# Patient Record
Sex: FEMALE | Race: WHITE | NOT HISPANIC OR LATINO | Employment: OTHER | URBAN - METROPOLITAN AREA
[De-identification: names, ages, dates, MRNs, and addresses within clinical notes are randomized per-mention and may not be internally consistent; named-entity substitution may affect disease eponyms.]

---

## 2017-02-15 ENCOUNTER — LAB REQUISITION (OUTPATIENT)
Dept: LAB | Facility: HOSPITAL | Age: 67
End: 2017-02-15

## 2017-02-15 DIAGNOSIS — Z00.00 ENCOUNTER FOR GENERAL ADULT MEDICAL EXAMINATION WITHOUT ABNORMAL FINDINGS: ICD-10-CM

## 2017-02-15 PROCEDURE — 87186 SC STD MICRODIL/AGAR DIL: CPT | Performed by: OTOLARYNGOLOGY

## 2017-02-15 PROCEDURE — 87070 CULTURE OTHR SPECIMN AEROBIC: CPT | Performed by: OTOLARYNGOLOGY

## 2017-02-15 PROCEDURE — 87147 CULTURE TYPE IMMUNOLOGIC: CPT | Performed by: OTOLARYNGOLOGY

## 2017-02-15 PROCEDURE — 87205 SMEAR GRAM STAIN: CPT | Performed by: OTOLARYNGOLOGY

## 2017-02-17 LAB
BACTERIA SPEC AEROBE CULT: ABNORMAL
GRAM STN SPEC: ABNORMAL
GRAM STN SPEC: ABNORMAL

## 2017-03-29 ENCOUNTER — LAB REQUISITION (OUTPATIENT)
Dept: LAB | Facility: HOSPITAL | Age: 67
End: 2017-03-29

## 2017-03-29 DIAGNOSIS — J32.8 OTHER CHRONIC SINUSITIS: ICD-10-CM

## 2017-03-29 PROCEDURE — 87070 CULTURE OTHR SPECIMN AEROBIC: CPT | Performed by: OTOLARYNGOLOGY

## 2017-03-29 PROCEDURE — 87205 SMEAR GRAM STAIN: CPT | Performed by: OTOLARYNGOLOGY

## 2017-03-29 PROCEDURE — 87147 CULTURE TYPE IMMUNOLOGIC: CPT | Performed by: OTOLARYNGOLOGY

## 2017-03-29 PROCEDURE — 87186 SC STD MICRODIL/AGAR DIL: CPT | Performed by: OTOLARYNGOLOGY

## 2017-04-01 LAB
BACTERIA SPEC AEROBE CULT: ABNORMAL
GRAM STN SPEC: ABNORMAL
GRAM STN SPEC: ABNORMAL

## 2017-07-07 ENCOUNTER — LAB REQUISITION (OUTPATIENT)
Dept: LAB | Facility: HOSPITAL | Age: 67
End: 2017-07-07

## 2017-07-07 DIAGNOSIS — Z00.00 ENCOUNTER FOR GENERAL ADULT MEDICAL EXAMINATION WITHOUT ABNORMAL FINDINGS: ICD-10-CM

## 2017-07-07 PROCEDURE — 87070 CULTURE OTHR SPECIMN AEROBIC: CPT | Performed by: OTOLARYNGOLOGY

## 2017-07-07 PROCEDURE — 87205 SMEAR GRAM STAIN: CPT | Performed by: OTOLARYNGOLOGY

## 2017-07-07 PROCEDURE — 87186 SC STD MICRODIL/AGAR DIL: CPT | Performed by: OTOLARYNGOLOGY

## 2017-07-07 PROCEDURE — 87147 CULTURE TYPE IMMUNOLOGIC: CPT | Performed by: OTOLARYNGOLOGY

## 2017-07-09 LAB
BACTERIA SPEC AEROBE CULT: ABNORMAL
GRAM STN SPEC: ABNORMAL
GRAM STN SPEC: ABNORMAL

## 2017-12-07 ENCOUNTER — OFFICE VISIT (OUTPATIENT)
Dept: PAIN MEDICINE | Facility: CLINIC | Age: 67
End: 2017-12-07

## 2017-12-07 VITALS
SYSTOLIC BLOOD PRESSURE: 153 MMHG | HEART RATE: 86 BPM | TEMPERATURE: 97.6 F | RESPIRATION RATE: 16 BRPM | BODY MASS INDEX: 41.66 KG/M2 | WEIGHT: 180 LBS | DIASTOLIC BLOOD PRESSURE: 98 MMHG | HEIGHT: 55 IN | OXYGEN SATURATION: 94 %

## 2017-12-07 DIAGNOSIS — M54.16 LEFT LUMBAR RADICULOPATHY: ICD-10-CM

## 2017-12-07 DIAGNOSIS — G57.02 PIRIFORMIS SYNDROME OF LEFT SIDE: Primary | ICD-10-CM

## 2017-12-07 DIAGNOSIS — M79.18 PAIN IN LEFT BUTTOCK: ICD-10-CM

## 2017-12-07 LAB
POC AMPHETAMINES: NEGATIVE
POC BARBITURATES: NEGATIVE
POC BENZODIAZEPHINES: POSITIVE
POC COCAINE: NEGATIVE
POC METHADONE: NEGATIVE
POC METHAMPHETAMINE SCREEN URINE: NEGATIVE
POC OPIATES: POSITIVE
POC OXYCODONE: NEGATIVE
POC PHENCYCLIDINE: NEGATIVE
POC PROPOXYPHENE: NEGATIVE
POC THC: NEGATIVE
POC TRICYCLIC ANTIDEPRESSANTS: NEGATIVE

## 2017-12-07 PROCEDURE — 99204 OFFICE O/P NEW MOD 45 MIN: CPT | Performed by: PAIN MEDICINE

## 2017-12-07 PROCEDURE — 80305 DRUG TEST PRSMV DIR OPT OBS: CPT | Performed by: PAIN MEDICINE

## 2017-12-07 RX ORDER — FLUTICASONE PROPIONATE 50 MCG
SPRAY, SUSPENSION (ML) NASAL
COMMUNITY
Start: 2013-08-23 | End: 2018-02-08

## 2017-12-07 RX ORDER — MELOXICAM 15 MG/1
TABLET ORAL
Refills: 0 | COMMUNITY
Start: 2017-09-06 | End: 2017-12-07 | Stop reason: SDUPTHER

## 2017-12-07 RX ORDER — OXYCODONE AND ACETAMINOPHEN 7.5; 325 MG/1; MG/1
TABLET ORAL
COMMUNITY
Start: 2013-07-23 | End: 2017-12-07

## 2017-12-07 RX ORDER — HYDROCODONE BITARTRATE AND ACETAMINOPHEN 7.5; 325 MG/1; MG/1
1 TABLET ORAL EVERY 8 HOURS PRN
Qty: 90 TABLET | Refills: 0 | Status: SHIPPED | OUTPATIENT
Start: 2017-12-07 | End: 2017-12-28 | Stop reason: DRUGHIGH

## 2017-12-07 RX ORDER — PANTOPRAZOLE SODIUM 40 MG/1
40 TABLET, DELAYED RELEASE ORAL DAILY PRN
COMMUNITY
Start: 2015-07-24

## 2017-12-07 RX ORDER — TRAMADOL HYDROCHLORIDE 50 MG/1
50 TABLET ORAL EVERY 6 HOURS PRN
COMMUNITY
End: 2017-12-07

## 2017-12-07 RX ORDER — TRAZODONE HYDROCHLORIDE 50 MG/1
50 TABLET ORAL
COMMUNITY
Start: 2017-03-29 | End: 2018-02-08 | Stop reason: RX

## 2017-12-07 RX ORDER — BACLOFEN 10 MG/1
TABLET ORAL
Refills: 0 | COMMUNITY
Start: 2017-11-08 | End: 2018-02-08

## 2017-12-07 RX ORDER — HYDROCODONE BITARTRATE AND ACETAMINOPHEN 5; 325 MG/1; MG/1
TABLET ORAL
Refills: 0 | COMMUNITY
Start: 2017-11-09 | End: 2017-12-07

## 2017-12-07 RX ORDER — AMLODIPINE BESYLATE AND BENAZEPRIL HYDROCHLORIDE 5; 10 MG/1; MG/1
CAPSULE ORAL DAILY
COMMUNITY
Start: 2013-07-23 | End: 2018-03-22

## 2017-12-07 RX ORDER — LORAZEPAM 1 MG/1
TABLET ORAL
COMMUNITY
Start: 2013-07-23 | End: 2018-03-22

## 2017-12-07 RX ORDER — MELOXICAM 15 MG/1
TABLET ORAL
COMMUNITY
Start: 2013-07-23 | End: 2017-12-28

## 2017-12-07 NOTE — PROGRESS NOTES
"CHIEF COMPLAINT: Leg Pain    Jocelyn Grimes is a 67 y.o. female.   He was referred here by Dr Ellington. He presents to the office for evaluation and treatment of left Leg Pain    HPI  Leg Pain  Started 10/17, no known accident or trauma, woke up with pain. However, Pt had a Lumbar fusion in 9/2015 per Dr Deni Gomez with excellent relief until \"suddenly\" developing L leg pain on 10/31/17. Pt still has no further LBP since 2015 fusion. Saw PCP for acute worsening left leg pain, ordered steroid taper and norco. Taking NSAIDS, baclofen, norco, steroid with minimal to no help. Trouble walking, sleeping, walking due to pain.     The patient states their pain is a 8 on a scale of 1-10.  The patient describes this pain as constant dull and ache.  The pain is located in left buttock and does radiate posterior left leg down to foot. This painful problem is aggravated by physical activity and walking and is alleviated by relaxation.    Not falling, tripping. No weakness in LLE.     Past pain medications:   Gabapentin - no help  Medrol dose pack - no help  Aleve prn - some help  Tramadol 50 mg qid prn - minimal help  norco 10/325 mg - helped    Current pain medications:   norco 5/325 mg - 1-2 tablets a day  mobic 15 mg daily - minimal help  Baclofen 10 mg    Past therapies:  Physical Therapy: no - not for LLE pain - for back pain years ago  Chiropractor: no  Massage Therapy: no  TENS: no  Neck or back surgery: yes - lumbar fusion 9/2015 by Dr. Gomez  Past pain management: yes - saw Dr. Paulino prior to surgery 2015.     Previous Injections: yes, pre lumbar fusion, total of 6 Lumbar ESIs, per Dr. Croft in 2015 with moderate relief  Effect of Injection (%): 60% to 30%   Length of Relief: 3 months to 3 weeks.     PEG Assessment   What number best describes your pain on average in the past week? 9  What number best describes how, during the past week, pain has interfered with your enjoyment of life? 8  What number " best describes how, during the past week, pain has interfered with your general activity? 9      Current Outpatient Prescriptions:   •  amLODIPine-benazepril (LOTREL) 5-10 MG per capsule, Take  by mouth Daily., Disp: , Rfl:   •  baclofen (LIORESAL) 10 MG tablet, take 1 tablet by mouth three times a day if needed for muscle spasm, Disp: , Rfl: 0  •  fluticasone (FLONASE) 50 MCG/ACT nasal spray, into each nostril., Disp: , Rfl:   •  LORazepam (ATIVAN) 1 MG tablet, Take  by mouth., Disp: , Rfl:   •  meloxicam (MOBIC) 15 MG tablet, Take  by mouth., Disp: , Rfl:   •  pantoprazole (PROTONIX) 40 MG EC tablet, Take 40 mg by mouth., Disp: , Rfl:   •  traZODone (DESYREL) 50 MG tablet, Take 50 mg by mouth., Disp: , Rfl:   •  HYDROcodone-acetaminophen (NORCO) 7.5-325 MG per tablet, Take 1 tablet by mouth Every 8 (Eight) Hours As Needed for Moderate Pain  or Severe Pain ., Disp: 90 tablet, Rfl: 0    REVIEW OF PERTINENT MEDICAL DATA  Chart reviewed and summarization of all medical records up to new patient visit performed.  PCP notes reviewed. Diagnosed     IMAGING            Missing 2nd page from mri performed in 2015.     PFSH:  The following portions of the patient's history were reviewed and updated as appropriate: problem list, past medical history, past surgery history, social history, family history, medications, and allergies    Review of Systems   Constitutional: Positive for activity change (decreased) and appetite change (decreased).   HENT: Positive for hearing loss and sinus pressure. Negative for trouble swallowing.    Eyes: Negative for visual disturbance.   Respiratory: Negative for apnea, chest tightness and shortness of breath.    Cardiovascular: Negative for chest pain.   Gastrointestinal: Negative for constipation, diarrhea and nausea.   Genitourinary: Negative for difficulty urinating and dysuria.   Musculoskeletal: Back pain: L leg pain.   Neurological: Negative for dizziness, light-headedness and numbness.  "  Psychiatric/Behavioral: Positive for sleep disturbance. Negative for suicidal ideas.   All other systems reviewed and are negative.      Vitals:    12/07/17 1037   BP: 153/98   Pulse: 86   Resp: 16   Temp: 97.6 °F (36.4 °C)   SpO2: 94%   Weight: 81.6 kg (180 lb)   Height: 64 cm (25.2\")   PainSc: 8  Comment: L leg pain,posterior, ranges from 6-9/10   PainLoc: Leg       Physical Exam   Constitutional: She appears well-developed and well-nourished. No distress.   HENT:   Head: Normocephalic and atraumatic.   Nose: Nose normal.   Mouth/Throat: Oropharynx is clear and moist.   Eyes: Conjunctivae and EOM are normal.   Neck: Normal range of motion. Neck supple.   Cardiovascular: Normal rate, regular rhythm and normal heart sounds.    Pulmonary/Chest: Effort normal and breath sounds normal. No stridor. No respiratory distress.   Abdominal: Soft. Bowel sounds are normal. She exhibits no distension. There is no tenderness.   Musculoskeletal:        Lumbar back: She exhibits decreased range of motion. She exhibits no tenderness and no pain.   Neurological: She is alert. She has normal strength. No cranial nerve deficit or sensory deficit. Gait (shuffling) abnormal. Coordination normal.   Skin: Skin is warm and dry. No rash noted. She is not diaphoretic.   Psychiatric: She has a normal mood and affect. Her speech is normal and behavior is normal.   Nursing note and vitals reviewed.    Back Exam     Tests   Straight leg raise right: negative  Straight leg raise left: positive          Neurologic Exam     Mental Status   Speech: speech is normal     Cranial Nerves     CN III, IV, VI   Extraocular motions are normal.     Motor Exam     Strength   Strength 5/5 throughout.       No results found for: POCMETH, POCAMPHET, POCBARBITUR, POCBENZO, POCCOCAINE, POCMETHADO, POCOPIATES, POCOXYCODO, POCPHENCYC, POCPROPOXY, POCTHC, POCTRICYC    Comments: Reviewed POC today      Date of last TRENT reviewed : 12/07/17   Comments: Consistent "     Assessment/Plan   Jocelyn was seen today for leg pain.    Diagnoses and all orders for this visit:    Piriformis syndrome of left side  -     Case Request  -     HYDROcodone-acetaminophen (NORCO) 7.5-325 MG per tablet; Take 1 tablet by mouth Every 8 (Eight) Hours As Needed for Moderate Pain  or Severe Pain .  -     Ambulatory Referral to Physical Therapy Evaluate and treat  -     POC Urine Drug Screen, Triage  -     Urine Drug Screen Confirmation - Urine, Urine, Clean Catch    Pain in left buttock    Left lumbar radiculopathy      Requested Prescriptions     Signed Prescriptions Disp Refills   • HYDROcodone-acetaminophen (NORCO) 7.5-325 MG per tablet 90 tablet 0     Sig: Take 1 tablet by mouth Every 8 (Eight) Hours As Needed for Moderate Pain  or Severe Pain .     - Patient sounds like classic pirirformis syndrome. Discussed treatment options.   - Will place PT referral.   - Continue to take mobic daily for short term. Continue to take baclofen prn for short term.   - Discussed with the patient regarding the etiology of their pain. Informed them that they would likely benefit from a left sided piriformis muscle steroid injection.  The procedure was described in detail and the risks, benefits and alternatives were discussed with the patient (including but not limited to: bleeding, infection, nerve damage, worsening of pain, inability to perform injection, paralysis, seizures, and death) who agreed to proceed.     - Will perform two injections approx 1 month apart.   - Will write for norco short term until she can get some relief. Will increase strength from 5 to 7.5 for better control. Do not plan on prescribing long term. If steroid injection with PT does not resolve pain, will repeat low back imaging and consider evaluation by surgeon given history of low back surgery.   - Continue home exercise program.       Patient counseled on the importance of weight loss to help with overall health and pain control.  Patient instructed to attempt weight loss.   Plan: Calorie counting reduce portion size, cut out extra servings and reduce fast food intake    Follow-up in 2 months.    Juana Summers MD  Pain Management

## 2017-12-07 NOTE — PATIENT INSTRUCTIONS
Piriformis Syndrome With Rehab  Piriformis syndrome is a condition the affects the nervous system in the area of the hip, and is characterized by pain and possibly a loss of feeling in the backside (posterior) thigh that may extend down the entire length of the leg. The symptoms are caused by an increase in pressure on the sciatic nerve by the piriformis muscle, which is on the back of the hip and is responsible for externally rotating the hip. The sciatic nerve and its branches connect to much of the leg. Normally the sciatic nerve runs between the piriformis muscle and other muscles. However, in certain individuals the nerve runs through the muscle, which causes an increase in pressure on the nerve and results in the symptoms of piriformis syndrome.  SYMPTOMS   · Pain, tingling, numbness, or burning in the back of the thigh that may also extend down the entire leg.  · Occasionally, tenderness in the buttock.  · Loss of function of the leg.  · Pain that worsens when using the piriformis muscle (running, jumping, or stairs).  · Pain that increases with prolonged sitting.  · Pain that is lessened by lying flat on the back.  CAUSES   · Piriformis syndrome is the result of an increase in pressure placed on the sciatic nerve. Oftentimes, piriformis syndrome is an overuse injury.  · Stress placed on the nerve from a sudden increase in the intensity, frequency, or duration of training.  · Compensation of other extremity injuries.  RISK INCREASES WITH:  · Sports that involve the piriformis muscle (running, walking, or jumping).  · You are born with (congenital) a defect in which the sciatic nerve passes through the muscle.  PREVENTION  · Warm up and stretch properly before activity.  · Allow for adequate recovery between workouts.  · Maintain physical fitness:    Strength, flexibility, and endurance.    Cardiovascular fitness.  PROGNOSIS   If treated properly, the symptoms of piriformis syndrome usually resolve in 2 to 6  weeks.  RELATED COMPLICATIONS   · Persistent and possibly permanent pain and numbness in the lower extremity.  · Weakness of the extremity that may progress to disability and inability to compete.  TREATMENT   The most effective treatment for piriformis syndrome is rest from any activities that aggravate the symptoms. Ice and pain medication may help reduce pain and inflammation. The use of strengthening and stretching exercises may help reduce pain with activity. These exercises may be performed at home or with a therapist. A referral to a therapist may be given for further evaluation and treatment, such as ultrasound. Corticosteroid injections may be given to reduce inflammation that is causing pressure to be placed on the sciatic nerve. If nonsurgical (conservative) treatment is unsuccessful, then surgery may be recommended.   MEDICATION   · If pain medication is necessary, then nonsteroidal anti-inflammatory medications, such as aspirin and ibuprofen, or other minor pain relievers, such as acetaminophen, are often recommended.  · Do not take pain medication for 7 days before surgery.  · Prescription pain relievers may be given if deemed necessary by your caregiver. Use only as directed and only as much as you need.  · Corticosteroid injections may be given by your caregiver. These injections should be reserved for the most serious cases, because they may only be given a certain number of times.  HEAT AND COLD:   · Cold treatment (icing) relieves pain and reduces inflammation. Cold treatment should be applied for 10 to 15 minutes every 2 to 3 hours for inflammation and pain and immediately after any activity that aggravates your symptoms. Use ice packs or massage the area with a piece of ice (ice massage).  · Heat treatment may be used prior to performing the stretching and strengthening activities prescribed by your caregiver, physical therapist, or . Use a heat pack or soak the injury in warm  water.  SEEK IMMEDIATE MEDICAL CARE IF:  · Treatment seems to offer no benefit, or the condition worsens.  · Any medications produce adverse side effects.  EXERCISES  RANGE OF MOTION (ROM) AND STRETCHING EXERCISES - Piriformis Syndrome  These exercises may help you when beginning to rehabilitate your injury. Your symptoms may resolve with or without further involvement from your physician, physical therapist, or . While completing these exercises, remember:   · Restoring tissue flexibility helps normal motion to return to the joints. This allows healthier, less painful movement and activity.  · An effective stretch should be held for at least 30 seconds.  · A stretch should never be painful. You should only feel a gentle lengthening or release in the stretched tissue.  STRETCH - Hip Rotators  · Lie on your back on a firm surface. Grasp your right / left knee with your right / left hand and your ankle with your opposite hand.  · Keeping your hips and shoulders firmly planted, gently pull your right / left knee and rotate your lower leg toward your opposite shoulder until you feel a stretch in your buttocks.  · Hold this stretch for __________ seconds.  Repeat this stretch __________ times. Complete this stretch __________ times per day.  STRETCH - Iliotibial Band  · On the floor or bed, lie on your side so your right / left leg is on top. Bend your knee and grab your ankle.  · Slowly bring your knee back so that your thigh is in line with your trunk. Keep your heel at your buttocks and gently arch your back so your head, shoulders, and hips line up.  · Slowly lower your leg so that your knee approaches the floor/bed until you feel a gentle stretch on the outside of your right / left thigh. If you do not feel a stretch and your knee will not fall farther, place the heel of your opposite foot on top of your knee and pull your thigh down farther.  · Hold this stretch for __________ seconds.  Repeat  __________ times. Complete __________ times per day.  STRENGTHENING EXERCISES - Piriformis Syndrome   These are some of the caregiver again or until your symptoms are resolved. Remember:   · Strong muscles with good endurance tolerate stress better.  · Do the exercises as initially prescribed by your caregiver. Progress slowly with each exercise, gradually increasing the number of repetitions and weight used under their guidance.  STRENGTH - Hip Abductors, Straight Leg Raises  Be aware of your form throughout the entire exercise so that you exercise the correct muscles. Sloppy form means that you are not strengthening the correct muscles.  · Lie on your side so that your head, shoulders, knee, and hip line up. You may bend your lower knee to help maintain your balance. Your right / left leg should be on top.  · Roll your hips slightly forward, so that your hips are stacked directly over each other and your right / left knee is facing forward.  · Lift your top leg up 4-6 inches, leading with your heel. Be sure that your foot does not drift forward or that your knee does not roll toward the ceiling.  · Hold this position for __________ seconds. You should feel the muscles in your outer hip lifting (you may not notice this until your leg begins to tire).  · Slowly lower your leg to the starting position. Allow the muscles to fully relax before beginning the next repetition.  Repeat __________ times. Complete this exercise __________ times per day.   STRENGTH - Hip Abductors, Quadruped  · On a firm, lightly padded surface, position yourself on your hands and knees. Your hands should be directly below your shoulders and your knees should be directly below your hips.  · Keeping your right / left knee bent, lift your leg out to the side. Keep your legs level and in line with your shoulders.  · Position yourself on your hands and knees.  · Hold for __________ seconds.  · Keeping your trunk steady and your hips level, slowly  "lower your leg to the starting position.  Repeat __________ times. Complete this exercise __________ times per day.   STRENGTH - Hip Abductors, Standing  · Tie one end of a rubber exercise band/tubing to a secure surface (table, pole) and tie a loop at the other end.  · Place the loop around your right / left ankle. Keeping your ankle with the band directly opposite of the secured end, step away until there is tension in the tube/band.  · Hold onto a chair as needed for balance.  · Keeping your back upright, your shoulders over your hips, and your toes pointing forward, lift your right / left leg out to your side. Be sure to lift your leg with your hip muscles. Do not \"throw\" your leg or tip your body to lift your leg.  · Slowly and with control, return to the starting position.  Repeat exercise __________ times. Complete this exercise __________ times per day.      This information is not intended to replace advice given to you by your health care provider. Make sure you discuss any questions you have with your health care provider.     Document Released: 12/18/2006 Document Revised: 05/03/2016 Document Reviewed: 11/29/2016  [employer]vier Interactive Patient Education ©2017 [employer]    "

## 2017-12-08 ENCOUNTER — TELEPHONE (OUTPATIENT)
Dept: PAIN MEDICINE | Facility: CLINIC | Age: 67
End: 2017-12-08

## 2017-12-13 ENCOUNTER — DOCUMENTATION (OUTPATIENT)
Dept: PAIN MEDICINE | Facility: CLINIC | Age: 67
End: 2017-12-13

## 2017-12-13 ENCOUNTER — OUTSIDE FACILITY SERVICE (OUTPATIENT)
Dept: PAIN MEDICINE | Facility: CLINIC | Age: 67
End: 2017-12-13

## 2017-12-13 PROCEDURE — 77002 NEEDLE LOCALIZATION BY XRAY: CPT | Performed by: PAIN MEDICINE

## 2017-12-13 PROCEDURE — 20552 NJX 1/MLT TRIGGER POINT 1/2: CPT | Performed by: PAIN MEDICINE

## 2017-12-13 NOTE — PROGRESS NOTES
Left sided Piriformis Injection  (unilateral)  [facility]    PREOPERATIVE DIAGNOSIS:  Piriformis syndrome, myofascial pain syndrome.    POSTOPERATIVE DIAGNOSIS:  Same as preop diagnosis    PROCEDURE:   Diagnostic & Therapeutic Piriformis Injection at the on the left     PRE-PROCEDURE DISCUSSION WITH PATIENT:    Risks and complications were discussed with the patient prior to starting the procedure and informed consent was obtained.  We discussed various topics including but not limited to bleeding, infection, injury, nerve injury, paralysis, coma, death, postprocedural painful flare-up, postprocedural site soreness, and a lack of pain relief.  We discussed the diagnostic aspect of piriformis injection and the potential therapy of this type of a trigger point injection.    SURGEON:  Juana Summers MD    REASON FOR PROCEDURE:    This patient does display a positive piriformis sign on exam, and has tenderness in the area that is overlying the distribution of the piriformis on exam under x-ray    SEDATION:  Versed 2mg & Fentanyl 100 mcg IV  ANESTHETIC:  Marcaine 0.25%  STEROID:  Methylprednisolone (DEPO MEDROL) 80mg/ml    DESCRIPTON OF PROCEDURE:  After obtaining informed consent, an I.V. was started in the preoperative area. The patient taken to the operating room and was placed in the prone position with a pillow under the abdomen.  All pressure points were well padded.  EKG, blood pressure, and pulse oximeter were monitored.  The lumbar area was prepped with Chloraprep and draped in a sterile fashion.     Under fluoroscopic guidance the point just lateral to the inferiormost point of the sacroiliac was visualized, which is also at the cephalad aspect of the sciatic notch.  Point was marked overlying the margin of the periosteum there.  This point was anesthetized with subcutaneous Lidocaine 1%, and then a spinal needle was advanced to contact this margin of the bone.  The needle was walked  caudad off the bone and advanced about 1-2mm further.  Resistance was felt upon entering this muscular trigger point.  Aspiration was checked and was negative.   Next, 1 mL of Omnipaque was injected. After seeing adequate spread in the corresponding piriformis muscle, flowing toward the greater trocanter, a total volume 2.5mL of injectate containing above mentioned local anesthetic solution was injected into the trigger point.    The needle was removed intact.  Vital signs were stable throughout.      ESTIMATED BLOOD LOSS:  minimal  SPECIMENS:  none    COMPLICATIONS:   No complications were noted. and There was no indication of vascular uptake on live injection of contrast dye.    TOLERANCE & DISCHARGE CONDITION:    The patient tolerated the procedure well.  The patient was transported to the recovery area without difficulties.  The patient was discharged to home under the care of family in stable and satisfactory condition.    PLAN OF CARE:  1. The patient was given our standard instruction sheet.  2. The patient will Repeat injection 4 wks.  3. The patient will resume all medications as per the medication reconciliation sheet.

## 2017-12-18 ENCOUNTER — TELEPHONE (OUTPATIENT)
Dept: PAIN MEDICINE | Facility: CLINIC | Age: 67
End: 2017-12-18

## 2017-12-18 NOTE — TELEPHONE ENCOUNTER
Patient called and states that she was told to contact the office if her pain was not any better after her procedure

## 2017-12-19 NOTE — TELEPHONE ENCOUNTER
Jenna, I received your message about her wanting to move up her procedure due to pain relief being temporary and pain returning. Please call patient back and add her on to the schedule tmrw at the end of the day. Tell her we will modify the procedure slightly to try to get better pain relief. Add to schedule as left sided S1 TFESI along with piriformis injection.   Thank you.

## 2017-12-20 ENCOUNTER — OUTSIDE FACILITY SERVICE (OUTPATIENT)
Dept: PAIN MEDICINE | Facility: CLINIC | Age: 67
End: 2017-12-20

## 2017-12-20 ENCOUNTER — DOCUMENTATION (OUTPATIENT)
Dept: PAIN MEDICINE | Facility: CLINIC | Age: 67
End: 2017-12-20

## 2017-12-20 PROCEDURE — 64483 NJX AA&/STRD TFRM EPI L/S 1: CPT | Performed by: PAIN MEDICINE

## 2017-12-20 PROCEDURE — 20552 NJX 1/MLT TRIGGER POINT 1/2: CPT | Performed by: PAIN MEDICINE

## 2017-12-20 NOTE — PROGRESS NOTES
LEFT S1 Lumbar Transforaminal Epidural Steroid Injection - Piriformis Injection  (left side)  [facility]    PREOPERATIVE DIAGNOSIS:  Lumbar Radiculopathy and left piriformis syndrome    POSTOPERATIVE DIAGNOSIS:  Same as preop diagnosis    PROCEDURE:    Diagnostic & Therapeutic Transforaminal Epidural Steroid Injection at the S1 level, on the LEFT                             Diagnostic & Therapeutic Piriformis Injection at the on the left     PRE-PROCEDURE DISCUSSION WITH PATIENT:    Risks and complications were discussed with the patient prior to starting the procedure and informed consent was obtained.  We discussed various topics including but not limited to bleeding, infection, injury, nerve injury, paralysis, coma, death, postprocedural painful flare-up, postprocedural site soreness, and a lack of pain relief.  We discussed the diagnostic aspect of transforaminal epidural / selective nerve root blockade.    SURGEON:  Juana Summers MD    REASON FOR PROCEDURE:    Diagnostic injection at this level is needed and Radicular pain pattern seems consistent with this dermatome. This patient does display a positive piriformis sign on exam, and has tenderness in the area that is overlying the distribution of the piriformis on exam under x-ray. No weakness in LLE. Small improvement with first piriformis injection with pain returning.     SEDATION:  Versed 2mg & Fentanyl 50 mcg IV  ANESTHETIC:  Marcaine 0.25%  STEROID:  Methylprednisolone (DEPO MEDROL) 80mg/ml    DESCRIPTON OF PROCEDURE:  After obtaining informed consent, an I.V. was started in the preoperative area. The patient taken to the operating room and was placed in the prone position with a pillow under the abdomen.  All pressure points were well padded.  EKG, blood pressure, and pulse oximeter were monitored.  The lumbosacral area was prepped with Chloraprep and draped in a sterile fashion. Under fluoroscopic guidance the upper  vertebral-equivalent level of the sacrum was identified, and in a slightly oblique view, the S1 posterior foramen was identified, and then a point just below the foramen at 6 o'clock position was identified. Skin and subcutaneous tissue was anesthetized with 1% lidocaine. A 22-gauge spinal needle was introduced under fluoroscopic guidance at the above junction and then redirected slightly cephalad and into the foramen without parasthesias and into the epidural space. After confirming the position of the needle with PA, lateral, and oblique fluoroscopic views, aspiration was checked and was clear of blood or CSF.  Next, 1 mL of Omnipaque was injected. After seeing adequate spread on the corresponding nerve root, a total volume 3mL of injectate containing 1ml of the above mentioned local anesthetic, 1 ml saline,  and the above mentioned corticosteroid was injected into the epidural space.    The needle was removed intact.      Under fluoroscopic guidance the point just lateral to the inferiormost point of the sacroiliac was visualized, which is also at the cephalad aspect of the sciatic notch.  Point was marked overlying the margin of the periosteum there.  This point was anesthetized with subcutaneous Lidocaine 1%, and then a spinal needle was advanced to contact this margin of the bone.  The needle was walked caudad off the bone and advanced about 1-2mm further.  Resistance was felt upon entering this muscular trigger point.  Aspiration was checked and was negative.   Next, 1 mL of Omnipaque was injected. After seeing adequate spread in the corresponding piriformis muscle, flowing toward the greater trocanter, a total volume 2.5mL of injectate containing above mentioned local anesthetic solution was injected into the trigger point.    The needle was removed intact.  Vital signs were stable throughout.      ESTIMATED BLOOD LOSS:  <5 mL  SPECIMENS:  none    COMPLICATIONS:   No complications were noted. and There was  no indication of vascular uptake on live injection of contrast dye.    TOLERANCE & DISCHARGE CONDITION:    The patient tolerated the procedure well.  The patient was transported to the recovery area without difficulties.  The patient was discharged to home under the care of family in stable and satisfactory condition.    PLAN OF CARE:  1. The patient was given our standard instruction sheet.  2. The patient will Return to clinic 2-3 wks.  3. The patient will resume all medications as per the medication reconciliation sheet.

## 2017-12-28 ENCOUNTER — OFFICE VISIT (OUTPATIENT)
Dept: PAIN MEDICINE | Facility: CLINIC | Age: 67
End: 2017-12-28

## 2017-12-28 VITALS
RESPIRATION RATE: 16 BRPM | OXYGEN SATURATION: 97 % | HEART RATE: 88 BPM | TEMPERATURE: 98.3 F | WEIGHT: 180 LBS | DIASTOLIC BLOOD PRESSURE: 93 MMHG | BODY MASS INDEX: 199.33 KG/M2 | SYSTOLIC BLOOD PRESSURE: 131 MMHG

## 2017-12-28 DIAGNOSIS — M54.42 CHRONIC LEFT-SIDED LOW BACK PAIN WITH LEFT-SIDED SCIATICA: Primary | ICD-10-CM

## 2017-12-28 DIAGNOSIS — G89.29 CHRONIC LEFT-SIDED LOW BACK PAIN WITH LEFT-SIDED SCIATICA: Primary | ICD-10-CM

## 2017-12-28 PROBLEM — M54.40 CHRONIC LEFT-SIDED LOW BACK PAIN WITH SCIATICA: Status: ACTIVE | Noted: 2017-12-28

## 2017-12-28 PROCEDURE — 99214 OFFICE O/P EST MOD 30 MIN: CPT | Performed by: PAIN MEDICINE

## 2017-12-28 RX ORDER — GABAPENTIN 300 MG/1
CAPSULE ORAL
Qty: 90 CAPSULE | Refills: 1 | Status: SHIPPED | OUTPATIENT
Start: 2017-12-28 | End: 2018-02-19 | Stop reason: SDUPTHER

## 2017-12-28 RX ORDER — OXYCODONE AND ACETAMINOPHEN 7.5; 325 MG/1; MG/1
1 TABLET ORAL 3 TIMES DAILY PRN
Qty: 90 TABLET | Refills: 0 | Status: SHIPPED | OUTPATIENT
Start: 2017-12-28 | End: 2018-01-26 | Stop reason: SDUPTHER

## 2017-12-28 NOTE — PROGRESS NOTES
"CHIEF COMPLAINT: Back Pain and Leg Pain  Pt seen by Dr Gomez today,will be scheduled for a Lumbar MRI-   HPI  Jocelyn Grimes is a 67 y.o. female.  She is here to follow up for Back Pain and Leg Pain    Jocelyn Grimes is a 67 y.o. female  who presents to the office for follow-up.  She completed a Left S1 TFESI on 12/20/17. Patient reports \"very little\" relief from the procedure. Some benefit but pain has returned over last several days and lays in bed crying due to pain. More benefit from TFESI than with piriformis injection alone.   The patient states their pain is a 6 on a scale of 1-10.  The patient describes this pain as constant dull, ache, sharp and shooting.  The pain is located in left low back and does radiate posterior left leg. This painful problem is aggravated by physical activity, movement and standing and is alleviated by past injection, relaxation and heat/heating pad   Had percocet in past and worked better than norco. Saw surgeon today who recommended left l5 s1 TFESI. Ordered new lumbar MRI going to perform tmrw.     Past pain medications:   Gabapentin - no help  Medrol dose pack - no help  Aleve prn - some help  Tramadol 50 mg qid prn - minimal help  norco 10/325 mg - helped     Current pain medications:   norco 7.5/325 mg - 1-2 tablets a day  mobic 15 mg daily - minimal help  Baclofen 10 mg     Past therapies:  Physical Therapy: no - not for LLE pain - for back pain years ago  Chiropractor: no  Massage Therapy: no  TENS: no  Neck or back surgery: yes - lumbar fusion 9/2015 by Dr. Gomez  Past pain management: yes - saw Dr. Paulino prior to surgery 2015.      Previous Injections: yes, pre lumbar fusion, total of 6 Lumbar ESIs, per Dr. Croft in 2015 with moderate relief  Effect of Injection (%): 60% to 30%   Length of Relief: 3 months to 3 weeks.     PEG Assessment   What number best describes your pain on average in the past week? 9  What number best describes how, during the past week, " pain has interfered with your enjoyment of life? 7  What number best describes how, during the past week, pain has interfered with your general activity? 7      Current Outpatient Prescriptions:   •  amLODIPine-benazepril (LOTREL) 5-10 MG per capsule, Take  by mouth Daily., Disp: , Rfl:   •  baclofen (LIORESAL) 10 MG tablet, take 1 tablet by mouth three times a day if needed for muscle spasm, Disp: , Rfl: 0  •  fluticasone (FLONASE) 50 MCG/ACT nasal spray, into each nostril., Disp: , Rfl:   •  gabapentin (NEURONTIN) 300 MG capsule, Take 1 cap PO QHS x 4 days; then increase to 1 cap BID x 4 days; then increase to 1 cap TID, Disp: 90 capsule, Rfl: 1  •  LORazepam (ATIVAN) 1 MG tablet, Take  by mouth., Disp: , Rfl:   •  oxyCODONE-acetaminophen (PERCOCET) 7.5-325 MG per tablet, Take 1 tablet by mouth 3 (Three) Times a Day As Needed for Severe Pain ., Disp: 90 tablet, Rfl: 0  •  pantoprazole (PROTONIX) 40 MG EC tablet, Take 40 mg by mouth., Disp: , Rfl:   •  traZODone (DESYREL) 50 MG tablet, Take 50 mg by mouth., Disp: , Rfl:     IMAGING           Imaging last reviewed: 12/28/17     PFSH:  The following portions of the patient's history were reviewed and updated as appropriate: problem list, past medical history, past surgery history, social history, family history, medications, and allergies    Review of Systems   Constitutional: Positive for activity change (decreased) and appetite change (decreased).   HENT: Positive for sinus pressure. Negative for hearing loss and trouble swallowing.    Eyes: Negative for visual disturbance.   Respiratory: Negative for apnea, chest tightness and shortness of breath.    Cardiovascular: Negative for chest pain.   Gastrointestinal: Negative for constipation, diarrhea and nausea.   Genitourinary: Negative for difficulty urinating and dysuria.   Musculoskeletal: Positive for back pain (L leg pain) and gait problem.   Neurological: Negative for dizziness, light-headedness, numbness and  headaches.   Psychiatric/Behavioral: Positive for sleep disturbance. Negative for suicidal ideas.   All other systems reviewed and are negative.      Vitals:    12/28/17 1457   BP: 131/93   Pulse: 88   Resp: 16   Temp: 98.3 °F (36.8 °C)   SpO2: 97%   Weight: 81.6 kg (180 lb)   PainSc: 6  Comment: L sided buttock/leg pain ranges from 5-9/10   PainLoc: Back       Physical Exam   Constitutional: She appears well-developed and well-nourished. No distress.   HENT:   Head: Normocephalic and atraumatic.   Nose: Nose normal.   Mouth/Throat: Oropharynx is clear and moist.   Eyes: Conjunctivae and EOM are normal.   Neck: Normal range of motion. Neck supple.   Pulmonary/Chest: Effort normal. No stridor. No respiratory distress.   Musculoskeletal:        Lumbar back: She exhibits decreased range of motion. She exhibits no tenderness and no pain.   Neurological: She is alert. She has normal strength. No cranial nerve deficit or sensory deficit. Gait (shuffling) abnormal. Coordination normal.   Skin: Skin is warm and dry. No rash noted. She is not diaphoretic.   Psychiatric: She has a normal mood and affect. Her speech is normal and behavior is normal.   Tearful on exam   Nursing note and vitals reviewed.    Ortho Exam  Neurologic Exam     Mental Status   Speech: speech is normal     Cranial Nerves     CN III, IV, VI   Extraocular motions are normal.     Motor Exam     Strength   Strength 5/5 throughout.       Lab Results   Component Value Date    POCMETH Negative 12/07/2017    POCAMPHET Negative 12/07/2017    POCBARBITUR Negative 12/07/2017    POCBENZO Positive 12/07/2017    POCCOCAINE Negative 12/07/2017    POCMETHADO Negative 12/07/2017    POCOPIATES Positive 12/07/2017    POCOXYCODO Negative 12/07/2017    POCPHENCYC Negative 12/07/2017    POCPROPOXY Negative 12/07/2017    POCTHC Negative 12/07/2017    POCTRICYC Negative 12/07/2017     Last UDS results reviewed: 12/28/17   Last UDS: 12/7/2017  Comments: Consistent     Date of  last TRENT reviewed : 12/28/17   Comments: Consistent     Assessment/Plan   Jocelyn was seen today for back pain and leg pain.    Diagnoses and all orders for this visit:    Chronic left-sided low back pain with left-sided sciatica  -     gabapentin (NEURONTIN) 300 MG capsule; Take 1 cap PO QHS x 4 days; then increase to 1 cap BID x 4 days; then increase to 1 cap TID  -     oxyCODONE-acetaminophen (PERCOCET) 7.5-325 MG per tablet; Take 1 tablet by mouth 3 (Three) Times a Day As Needed for Severe Pain .  -     Case Request      Requested Prescriptions     Signed Prescriptions Disp Refills   • gabapentin (NEURONTIN) 300 MG capsule 90 capsule 1     Sig: Take 1 cap PO QHS x 4 days; then increase to 1 cap BID x 4 days; then increase to 1 cap TID   • oxyCODONE-acetaminophen (PERCOCET) 7.5-325 MG per tablet 90 tablet 0     Sig: Take 1 tablet by mouth 3 (Three) Times a Day As Needed for Severe Pain .     - some benefit from left SI TFESI, will repeat with left L5, S1 TFESI as requested from surgeon. Can perform in 2 weeks to space out steroids. Will need steroid break after 3rd injection.   -  Discussed with the patient regarding the etiology of their pain. Informed them that they would likely benefit from a left L5 and S1 lumbar epidural steroid injection.  The procedure was described in detail and the risks, benefits and alternatives were discussed with the patient (including but not limited to: bleeding, infection, nerve damage, worsening of pain, inability to perform injection, paralysis, seizures, and death) who agreed to proceed.     - continue to follow up with surgeon and have MRI performed tmrw.   - discontinue norco due to ineffective. START percocet 7.5/325 mg tid prn pain.  Do not plan on prescribing long term. If steroid injection with PT does not resolve pain, will repeat low back imaging and consider evaluation by surgeon given history of low back surgery.   - Continue home exercise program.   - This was a 25  minute face to face visit with 15 minutes spent counseling on medication, usage and treatment plan.      Wt Readings from Last 3 Encounters:   12/28/17 81.6 kg (180 lb)   12/07/17 81.6 kg (180 lb)   12/17/13 90.7 kg (200 lb)     Body mass index is 199.33 kg/(m^2). Patient counseled on the importance of weight loss to help with overall health and pain control. Patient instructed to attempt weight loss.   Plan: Calorie counting  increase physical activity, cut out extra servings and reduce fast food intake    Follow-up in 1 month.    Juana Summers MD  Pain Management    TRENT REPORT  As part of the patient's treatment plan, I am prescribing controlled substances. The patient has been made aware of appropriate use of such medications, including potential risk of somnolence, limited ability to drive and/or work safely, and the potential for dependence or overdose. It has also bee made clear that these medications are for use by this patient only, without concomitant use of alcohol or other substances unless prescribed.   Patient has completed prescribing agreement detailing terms of continued prescribing of controlled substances, including monitoring TRENT reports, urine drug screening, and pill counts if necessary. The patient is aware that inappropriate use will results in cessation of prescribing such medications.  TRENT report has been reviewed and scanned into the patient's chart.  History and physical exam exhibit continued safe and appropriate use of controlled substances.

## 2018-01-02 ENCOUNTER — TELEPHONE (OUTPATIENT)
Dept: PAIN MEDICINE | Facility: CLINIC | Age: 68
End: 2018-01-02

## 2018-01-02 NOTE — TELEPHONE ENCOUNTER
Patient called and states that the gabapentin is making her mind feel foggy and that she just feels weird and that it is not helping at all. States that her pain is unchanged and she is still having severe pain.    She is interested in an injection that she says you mentioned to her for tomorrow

## 2018-01-02 NOTE — TELEPHONE ENCOUNTER
Please add her to the 3 pm open spot for tmrw as a left L5, S1 TFESI - per surgeon request.   Thank you.

## 2018-01-03 ENCOUNTER — OUTSIDE FACILITY SERVICE (OUTPATIENT)
Dept: PAIN MEDICINE | Facility: CLINIC | Age: 68
End: 2018-01-03

## 2018-01-03 ENCOUNTER — DOCUMENTATION (OUTPATIENT)
Dept: PAIN MEDICINE | Facility: CLINIC | Age: 68
End: 2018-01-03

## 2018-01-03 PROCEDURE — 64484 NJX AA&/STRD TFRM EPI L/S EA: CPT | Performed by: PAIN MEDICINE

## 2018-01-03 PROCEDURE — 64483 NJX AA&/STRD TFRM EPI L/S 1: CPT | Performed by: PAIN MEDICINE

## 2018-01-03 NOTE — PROGRESS NOTES
LTFESI with S1 TFESI  [facility]    PREOPERATIVE DIAGNOSIS:   Lumbar Radiculopathy, Lumbar Postlaminectomy Syndrome and Chronic Back Pain    POSTOPERATIVE DIAGNOSIS: Same as preop dx    PROCEDURE:    1. 64483 --  Diagnostic & Therapeutic  Transforaminal Epidural Steroid Injection at the  Left  L5 Level  2. 64484 -- S1 Transforaminal Epidural Steroid Injection on the Left    PRE-PROCEDURE DISCUSSION WITH PATIENT:    Risks and complications were discussed with the patient prior to starting the procedure and informed consent was obtained.    SURGEON:  Juana Summers MD    REASON FOR PROCEDURE:     Diagnostic injection at this level is needed, Making some clinical improvement after the previous procedure. and Radiating pattern of pain is likely consistent with degenerative changes in the area.    SEDATION:  Versed 2mg and Fentanyl 200mcg IV  ANESTHETIC: Marcaine 0.25%  STEROID:     Methylprednisolone (DEPO MEDROL) 80mg/ml  TOTAL VOLUME OF SOLUTION:   4mL    DESCRIPTON OF PROCEDURE:  After obtaining informed consent, an I.V. was started in the preoperative area. The patient taken to the operating room and was placed in the prone position with a pillow under the abdomen.  All pressure points were well padded.  EKG, blood pressure, and pulse oximeter were monitored.  The lumbosacral area was prepped with Chloraprep and draped in a sterile fashion. Under fluoroscopic guidance in an oblique dimension, the transverse process of the above mentioned Lumbar vertebra at the junction of the body at 6 o'clock position respective to the pedicle on the aforementioned side was identified. Skin and subcutaneous tissue was anesthetized with 2-3mL of 1% lidocaine. A 22-gauge spinal needle was introduced under fluoroscopic guidance at the above junction into the foramen without parasthesias and into the epidural space. After confirming the position of the needle with PA, lateral, and oblique fluoroscopic views,  aspiration was checked and was clear of blood or CSF.  Next, 1 mL of contrast dye was injected. After seeing adequate spread on the corresponding nerve root, a total volume 2 mL of injectate containing half of the previously mentioned local anesthetic solution was slowly and easily injected into the space.    The needle was removed intact.  Vital signs were stable.      Next, the S1 posterior foramen was identified on the above mentioned side with fluoroscopy in a PA dimension, and a spinal needle was introduced to just caudad to the 6 o’clock position of the foramen until contact with os; then the needle was walked off cephalad and into the foramen.   After confirming the position of the needle with PA and lateral fluoroscopic views, aspiration was checked and was clear of blood or CSF.  Next, 1 mL of contrast dye was injected. After seeing adequate spread on the S1 nerve root and into the caudal epidural space, a total volume 2mL of injectate containing the other half of the local anesthetic solution was easily and slowly injected into the epidural space.   The needle was removed intact.  Vital signs were stable.  Onset of analgesia was noted prior to transport to the recovery area.    ESTIMATED BLOOD LOSS:  <5 mL  SPECIMENS:  none    COMPLICATIONS:   No complications were noted. and There was no indication of vascular uptake on live injection of contrast dye.    TOLERANCE & DISCHARGE CONDITION:    The patient tolerated the procedure well.  The patient was transported to the recovery area without difficulties.  The patient was discharged to home under the care of family in stable and satisfactory condition.    PLAN OF CARE:  1. The patient was given our standard instruction sheet.  2. The patient will Return to clinic 3-4 wks  3. The patient will resume all medications as per the medication reconciliation sheet.

## 2018-01-26 DIAGNOSIS — M54.42 CHRONIC LEFT-SIDED LOW BACK PAIN WITH LEFT-SIDED SCIATICA: ICD-10-CM

## 2018-01-26 DIAGNOSIS — G89.29 CHRONIC LEFT-SIDED LOW BACK PAIN WITH LEFT-SIDED SCIATICA: ICD-10-CM

## 2018-01-26 NOTE — TELEPHONE ENCOUNTER
Medication Refill Request    Date of phone call: 1/25/18    Medication being requested: Oxycodone-apap 7.5 sig: 3xday  Qty: 90    Date of last visit: 12/28/17    Date of last refill: 12/28/17    TRENT up to date?: 12/6/17    Next Follow up?: 2/8/18    Any new pertinent information? (i.e, new medication allergies, new use of medications, change in patient's health or condition, non-compliance or inconsistency with prescribing agreement?): n/a

## 2018-01-29 RX ORDER — OXYCODONE AND ACETAMINOPHEN 7.5; 325 MG/1; MG/1
1 TABLET ORAL 3 TIMES DAILY PRN
Qty: 90 TABLET | Refills: 0 | Status: SHIPPED | OUTPATIENT
Start: 2018-01-29 | End: 2018-03-22 | Stop reason: SDUPTHER

## 2018-02-08 ENCOUNTER — OFFICE VISIT (OUTPATIENT)
Dept: PAIN MEDICINE | Facility: CLINIC | Age: 68
End: 2018-02-08

## 2018-02-08 VITALS
OXYGEN SATURATION: 97 % | BODY MASS INDEX: 202.65 KG/M2 | DIASTOLIC BLOOD PRESSURE: 88 MMHG | TEMPERATURE: 97.4 F | SYSTOLIC BLOOD PRESSURE: 122 MMHG | RESPIRATION RATE: 16 BRPM | HEART RATE: 77 BPM | WEIGHT: 183 LBS

## 2018-02-08 DIAGNOSIS — G57.02 PIRIFORMIS SYNDROME OF LEFT SIDE: ICD-10-CM

## 2018-02-08 DIAGNOSIS — G89.29 CHRONIC LEFT-SIDED LOW BACK PAIN WITH LEFT-SIDED SCIATICA: Primary | ICD-10-CM

## 2018-02-08 DIAGNOSIS — M54.42 CHRONIC LEFT-SIDED LOW BACK PAIN WITH LEFT-SIDED SCIATICA: Primary | ICD-10-CM

## 2018-02-08 PROCEDURE — 99213 OFFICE O/P EST LOW 20 MIN: CPT | Performed by: PAIN MEDICINE

## 2018-02-08 NOTE — PROGRESS NOTES
CHIEF COMPLAINT: Back Pain    HPI  Jocelyn Grimes is a 67 y.o. female.  She is here to follow up for Back Pain    Jocelyn Grimes is a 67 y.o. female  who presents to the office for follow-up.  She completed a left sided L5, S1 TFESI. Patient reports 30% to 40%  relief from the procedure.   Since last visit their pain has improved. Has seen surgeon since last visit and has surgery scheduled for 2/14 and 2/15. Also started gabapentin which is helping but causing a lot of mental cloudiness, blurry vision. Switched to percocet which significantly helped more than norco. Only taking at night now since injection.     The patient states their pain is a 5 on a scale of 1-10.  The patient describes this pain as constant dull, ache and pressure.  The pain is located in bilateral low back and does radiate posterior left leg. This painful problem is aggravated by physical activity and sitting and is alleviated by injection, pain medication and relaxation.    Past pain medications:   Gabapentin - no help  Medrol dose pack - no help  Aleve prn - some help  Tramadol 50 mg qid prn - minimal help  norco 10/325 mg - helped  norco 7.5/325 mg - 1-2 tablets a day      Current pain medications:   Percocet 7.5/325 mg tid - helping  mobic 15 mg daily - minimal help  Baclofen 10 mg      Past therapies:  Physical Therapy: no - not for LLE pain - for back pain years ago  Chiropractor: no  Massage Therapy: no  TENS: no  Neck or back surgery: yes - lumbar fusion 9/2015 by Dr. Gomez  Past pain management: yes - saw Dr. Paulino prior to surgery 2015.     Previous Injection: left L5, S1 TFESI - 1/3/2018  Effect of Injection (%): 30-40%  Length of Relief: several weeks       Previous Injections: yes, pre lumbar fusion, total of 6 Lumbar ESIs, per Dr. Croft in 2015 with moderate relief  Effect of Injection (%): 60% to 30%   Length of Relief: 3 months to 3 weeks.     PEG Assessment   What number best describes your pain on average in the past  week? 5  What number best describes how, during the past week, pain has interfered with your enjoyment of life? 6  What number best describes how, during the past week, pain has interfered with your general activity? 7      Current Outpatient Prescriptions:   •  pantoprazole (PROTONIX) 40 MG EC tablet, Take 40 mg by mouth., Disp: , Rfl:   •  benazepril (LOTENSIN) 5 MG tablet, Take 5 mg by mouth Daily. as directed, Disp: , Rfl: 0  •  fluticasone (FLONASE) 50 MCG/ACT nasal spray, , Disp: , Rfl: 0  •  gabapentin (NEURONTIN) 300 MG capsule, Take 1 capsule by mouth 3 (Three) Times a Day., Disp: 90 capsule, Rfl: 1  •  meloxicam (MOBIC) 15 MG tablet, Take 15 mg by mouth Daily., Disp: , Rfl: 0  •  omega-3 acid ethyl esters (LOVAZA) 1 g capsule, Take 2 g by mouth., Disp: , Rfl:   •  oxyCODONE-acetaminophen (PERCOCET) 7.5-325 MG per tablet, Take 1 tablet by mouth 3 (Three) Times a Day As Needed for Severe Pain ., Disp: 90 tablet, Rfl: 0    IMAGING          Imaging last reviewed: 03/22/18     PFSH:  The following portions of the patient's history were reviewed and updated as appropriate: problem list, past medical history, past surgery history, social history, family history, medications, and allergies    Review of Systems   Constitutional: Positive for activity change (decreased). Negative for appetite change (decreased).   HENT: Positive for sinus pressure. Negative for hearing loss and trouble swallowing.    Eyes: Negative for visual disturbance.   Respiratory: Negative for apnea, chest tightness and shortness of breath.    Cardiovascular: Negative for chest pain.   Gastrointestinal: Negative for constipation, diarrhea and nausea.   Genitourinary: Negative for difficulty urinating and dysuria.   Musculoskeletal: Positive for back pain (L leg pain) and gait problem.   Neurological: Positive for weakness (bilat. legs) and light-headedness. Negative for dizziness, numbness and headaches.   Psychiatric/Behavioral: Positive for  sleep disturbance. Negative for suicidal ideas.   All other systems reviewed and are negative.      Vitals:    02/08/18 1059   BP: 122/88   Pulse: 77   Resp: 16   Temp: 97.4 °F (36.3 °C)   SpO2: 97%   Weight: 83 kg (183 lb)   PainSc: 5  Comment: L sided LBP ranges from 5-7/10       Physical Exam   Constitutional: She appears well-developed and well-nourished. No distress.   HENT:   Head: Normocephalic and atraumatic.   Nose: Nose normal.   Mouth/Throat: Oropharynx is clear and moist.   Eyes: Conjunctivae and EOM are normal.   Neck: Normal range of motion. Neck supple.   Pulmonary/Chest: Effort normal. No stridor. No respiratory distress.   Musculoskeletal:        Lumbar back: She exhibits decreased range of motion. She exhibits no tenderness and no pain.   Neurological: She is alert. She has normal strength. No cranial nerve deficit or sensory deficit. Coordination normal.   Skin: Skin is warm and dry. No rash noted. She is not diaphoretic.   Psychiatric: She has a normal mood and affect. Her speech is normal and behavior is normal.   Nursing note and vitals reviewed.    Ortho Exam  Neurologic Exam     Mental Status   Speech: speech is normal     Cranial Nerves     CN III, IV, VI   Extraocular motions are normal.     Motor Exam     Strength   Strength 5/5 throughout.       Lab Results   Component Value Date    POCMETH Negative 12/07/2017    POCAMPHET Negative 12/07/2017    POCBARBITUR Negative 12/07/2017    POCBENZO Positive 12/07/2017    POCCOCAINE Negative 12/07/2017    POCMETHADO Negative 12/07/2017    POCOPIATES Positive 12/07/2017    POCOXYCODO Negative 12/07/2017    POCPHENCYC Negative 12/07/2017    POCPROPOXY Negative 12/07/2017    POCTHC Negative 12/07/2017    POCTRICYC Negative 12/07/2017     Last UDS results reviewed: 03/22/18   Last UDS: 12/7/2017  Comments: Consistent     Date of last TRENT reviewed : 03/22/18   Comments: Consistent - did not fill [facility] Rx of percocet    Assessment/Plan   Jocelyn was seen  today for back pain.    Diagnoses and all orders for this visit:    Chronic left-sided low back pain with left-sided sciatica    Piriformis syndrome of left side      Requested Prescriptions      No prescriptions requested or ordered in this encounter     - Some relief with right-sided transforaminal epidural steroid injections but very mild and only temporary.  Patient has been evaluated by the surgeon and is scheduled to undergo low back surgery next week.  - She is to continue her current dose of Percocet until surgery.  She has a prescription that was filled at the end of January but was not picked up at different death which she will  on her way out.  She is to fill this and only take up to surgery date.  The surgeon will supply postoperative pain medications which she is to take for the appropriate fill out time.  - If she needs more medication after the postoperative period she can follow-up with me we will discuss medication regimen but hopes that she will not need long-term narcotic therapy after surgery.  Discussed this with the patient also agrees.  - Last UDS performed was reviewed and consistent.    - Continue gabapentin 300 mg 3 times a day.  Side effects are very minor and are tolerated and the benefit she is receiving outweigh the minor side effects.  Hopefully she does not need this medication postoperatively.     Patient counseled on the importance of weight loss to help with overall health and pain control. Patient instructed to attempt weight loss.   Plan: Calorie counting  increase physical activity and reduce portion size    Follow-up as needed for pain - after surgery if needed.     Juana Summers MD  Pain Management    [facility] REPORT  As part of the patient's treatment plan, I am prescribing controlled substances. The patient has been made aware of appropriate use of such medications, including potential risk of somnolence, limited ability to drive and/or work safely, and the  potential for dependence or overdose. It has also bee made clear that these medications are for use by this patient only, without concomitant use of alcohol or other substances unless prescribed.   Patient has completed prescribing agreement detailing terms of continued prescribing of controlled substances, including monitoring TRENT reports, urine drug screening, and pill counts if necessary. The patient is aware that inappropriate use will results in cessation of prescribing such medications.  TRENT report has been reviewed and scanned into the patient's chart.  History and physical exam exhibit continued safe and appropriate use of controlled substances.

## 2018-02-17 DIAGNOSIS — G89.29 CHRONIC LEFT-SIDED LOW BACK PAIN WITH LEFT-SIDED SCIATICA: ICD-10-CM

## 2018-02-17 DIAGNOSIS — M54.42 CHRONIC LEFT-SIDED LOW BACK PAIN WITH LEFT-SIDED SCIATICA: ICD-10-CM

## 2018-02-19 DIAGNOSIS — G89.29 CHRONIC LEFT-SIDED LOW BACK PAIN WITH LEFT-SIDED SCIATICA: ICD-10-CM

## 2018-02-19 DIAGNOSIS — M54.42 CHRONIC LEFT-SIDED LOW BACK PAIN WITH LEFT-SIDED SCIATICA: ICD-10-CM

## 2018-02-20 RX ORDER — GABAPENTIN 300 MG/1
CAPSULE ORAL
Qty: 90 CAPSULE | Refills: 0 | OUTPATIENT
Start: 2018-02-20

## 2018-02-20 RX ORDER — GABAPENTIN 300 MG/1
300 CAPSULE ORAL 3 TIMES DAILY
Qty: 90 CAPSULE | Refills: 1 | OUTPATIENT
Start: 2018-02-20 | End: 2018-03-22 | Stop reason: SDUPTHER

## 2018-03-22 ENCOUNTER — OFFICE VISIT (OUTPATIENT)
Dept: PAIN MEDICINE | Facility: CLINIC | Age: 68
End: 2018-03-22

## 2018-03-22 VITALS
BODY MASS INDEX: 34.3 KG/M2 | HEIGHT: 63 IN | TEMPERATURE: 98.1 F | SYSTOLIC BLOOD PRESSURE: 138 MMHG | RESPIRATION RATE: 18 BRPM | HEART RATE: 76 BPM | OXYGEN SATURATION: 97 % | DIASTOLIC BLOOD PRESSURE: 93 MMHG | WEIGHT: 193.6 LBS

## 2018-03-22 DIAGNOSIS — G89.29 CHRONIC LEFT-SIDED LOW BACK PAIN WITH LEFT-SIDED SCIATICA: ICD-10-CM

## 2018-03-22 DIAGNOSIS — M54.42 CHRONIC LEFT-SIDED LOW BACK PAIN WITH LEFT-SIDED SCIATICA: ICD-10-CM

## 2018-03-22 PROCEDURE — 99213 OFFICE O/P EST LOW 20 MIN: CPT | Performed by: PAIN MEDICINE

## 2018-03-22 RX ORDER — OMEGA-3-ACID ETHYL ESTERS 1 G/1
2 CAPSULE, LIQUID FILLED ORAL
COMMUNITY

## 2018-03-22 RX ORDER — MELOXICAM 15 MG/1
15 TABLET ORAL DAILY
Refills: 0 | COMMUNITY
Start: 2018-03-01

## 2018-03-22 RX ORDER — BENAZEPRIL HYDROCHLORIDE 5 MG/1
5 TABLET, FILM COATED ORAL DAILY
Refills: 0 | COMMUNITY
Start: 2018-03-16 | End: 2018-12-14

## 2018-03-22 RX ORDER — FLUTICASONE PROPIONATE 50 MCG
SPRAY, SUSPENSION (ML) NASAL
Refills: 0 | COMMUNITY
Start: 2018-02-28

## 2018-03-22 RX ORDER — OXYCODONE AND ACETAMINOPHEN 7.5; 325 MG/1; MG/1
1 TABLET ORAL 3 TIMES DAILY PRN
Qty: 90 TABLET | Refills: 0 | Status: SHIPPED | OUTPATIENT
Start: 2018-03-22 | End: 2018-06-08 | Stop reason: SDUPTHER

## 2018-03-22 RX ORDER — GABAPENTIN 300 MG/1
300 CAPSULE ORAL 3 TIMES DAILY
Qty: 90 CAPSULE | Refills: 1 | Status: SHIPPED | OUTPATIENT
Start: 2018-03-22 | End: 2018-06-08 | Stop reason: SDUPTHER

## 2018-03-22 RX ORDER — GABAPENTIN 300 MG/1
300 CAPSULE ORAL 3 TIMES DAILY
Qty: 90 CAPSULE | Refills: 1 | OUTPATIENT
Start: 2018-03-22 | End: 2018-03-22 | Stop reason: SDUPTHER

## 2018-03-22 NOTE — PROGRESS NOTES
CHIEF COMPLAINT: Back Pain    HPI  Jocelyn Grimes is a 67 y.o. female.  She is here to follow up for Back Pain    Jocelyn Grimes is a 67 y.o. female  who presents to the office for follow-up.Since last visit their pain has improved. Ms. Grimes states that she has had increased leg pain since her last appt. since last visit patient had to cancel her low back surgery due to admission of severe diverticulitis.  Surgeries rescheduled for April 24.     The patient states their pain is a 6 on a scale of 1-10.  The patient describes this pain as constant dull and ache.  The pain is located in bilateral low back and does radiate anterior bilateral thigh, bilateral hip. This painful problem is aggravated by physical activity, standing and walking and is alleviated by past injection, pain medication and relaxation.  Feels her pain into bilateral groin region has worsened. Trouble walking, standing due to pain. Riding stationary bike couple miles a day to help keep up her strength.     Past pain medications:   Gabapentin - no help  Medrol dose pack - no help  Aleve prn - some help  Tramadol 50 mg qid prn - minimal help  norco 10/325 mg - helped  norco 7.5/325 mg - 1-2 tablets a day      Current pain medications:   Percocet 7.5/325 mg bid- tid - helping  mobic 15 mg daily - minimal help  Baclofen 10 mg      Past therapies:  Physical Therapy: no - not for LLE pain - for back pain years ago  Chiropractor: no  Massage Therapy: no  TENS: no  Neck or back surgery: yes - lumbar fusion 9/2015 by Dr. Gomez  Past pain management: yes - saw Dr. Paulino prior to surgery 2015.      Previous Injection: left L5, S1 TFESI - 1/3/2018  Effect of Injection (%): 30-40%  Length of Relief: several weeks       Previous Injections: yes, pre lumbar fusion, total of 6 Lumbar ESIs, per Dr. Croft in 2015 with moderate relief  Effect of Injection (%): 60% to 30%   Length of Relief: 3 months to 3 weeks.    PEG Assessment   What number best describes  your pain on average in the past week? 6  What number best describes how, during the past week, pain has interfered with your enjoyment of life? 7  What number best describes how, during the past week, pain has interfered with your general activity? 7      Current Outpatient Prescriptions:   •  benazepril (LOTENSIN) 5 MG tablet, Take 5 mg by mouth Daily. as directed, Disp: , Rfl: 0  •  fluticasone (FLONASE) 50 MCG/ACT nasal spray, , Disp: , Rfl: 0  •  gabapentin (NEURONTIN) 300 MG capsule, Take 1 capsule by mouth 3 (Three) Times a Day., Disp: 90 capsule, Rfl: 1  •  meloxicam (MOBIC) 15 MG tablet, Take 15 mg by mouth Daily., Disp: , Rfl: 0  •  omega-3 acid ethyl esters (LOVAZA) 1 g capsule, Take 2 g by mouth., Disp: , Rfl:   •  oxyCODONE-acetaminophen (PERCOCET) 7.5-325 MG per tablet, Take 1 tablet by mouth 3 (Three) Times a Day As Needed for Severe Pain ., Disp: 90 tablet, Rfl: 0  •  pantoprazole (PROTONIX) 40 MG EC tablet, Take 40 mg by mouth., Disp: , Rfl:     IMAGING      Imaging last reviewed: 03/22/18     PFSH:  The following portions of the patient's history were reviewed and updated as appropriate: problem list, past medical history, past surgery history, social history, family history, medications, and allergies    Review of Systems   Constitutional: Positive for fatigue.   HENT: Negative for congestion.    Eyes: Negative for visual disturbance.   Respiratory: Positive for shortness of breath. Negative for cough and wheezing.    Cardiovascular: Negative.    Gastrointestinal: Negative for constipation and diarrhea.   Genitourinary: Negative for difficulty urinating.   Musculoskeletal: Positive for back pain and joint swelling (bilateral feet and ankles).   Neurological: Positive for weakness (bilateral legs). Negative for numbness.   Psychiatric/Behavioral: Positive for sleep disturbance. Negative for suicidal ideas. The patient is not nervous/anxious.    All other systems reviewed and are  "negative.      Vitals:    03/22/18 1324   BP: 138/93   Pulse: 76   Resp: 18   Temp: 98.1 °F (36.7 °C)   SpO2: 97%   Weight: 87.8 kg (193 lb 9.6 oz)   Height: 160 cm (63\")   PainSc:   6   PainLoc: Back       Physical Exam   Constitutional: She appears well-developed and well-nourished. No distress.   HENT:   Head: Normocephalic.   Right Ear: External ear normal.   Left Ear: External ear normal.   Nose: Nose normal.   Mouth/Throat: Oropharynx is clear and moist.   Eyes: Conjunctivae and EOM are normal. Right eye exhibits no discharge. Left eye exhibits no discharge.   Neck: Neck supple.   Pulmonary/Chest: Effort normal. No stridor. No respiratory distress.   Musculoskeletal:        Lumbar back: She exhibits tenderness and pain.   Neurological: Coordination and gait normal.   Skin: Skin is warm and dry. No rash noted. She is not diaphoretic. No erythema.   Psychiatric: She has a normal mood and affect. Her behavior is normal.   Nursing note and vitals reviewed.    Back Exam     Tests   Straight leg raise right: negative  Straight leg raise left: negative          Neurologic Exam     Cranial Nerves     CN III, IV, VI   Extraocular motions are normal.       Lab Results   Component Value Date    POCMETH Negative 12/07/2017    POCAMPHET Negative 12/07/2017    POCBARBITUR Negative 12/07/2017    POCBENZO Positive 12/07/2017    POCCOCAINE Negative 12/07/2017    POCMETHADO Negative 12/07/2017    POCOPIATES Positive 12/07/2017    POCOXYCODO Negative 12/07/2017    POCPHENCYC Negative 12/07/2017    POCPROPOXY Negative 12/07/2017    POCTHC Negative 12/07/2017    POCTRICYC Negative 12/07/2017     Last UDS results reviewed: 03/22/18   Last UDS: 12/7/2017  Comments: Consistent     Date of last TRENT reviewed : 03/22/18   Comments: Tyler Banks was seen today for back pain.    Diagnoses and all orders for this visit:    Chronic left-sided low back pain with left-sided sciatica  -     gabapentin (NEURONTIN) 300 MG " capsule; Take 1 capsule by mouth 3 (Three) Times a Day.      Requested Prescriptions     Signed Prescriptions Disp Refills   • gabapentin (NEURONTIN) 300 MG capsule 90 capsule 1     Sig: Take 1 capsule by mouth 3 (Three) Times a Day.     - Some relief with right-sided transforaminal epidural steroid injections but very mild and only temporary.  Patient has been evaluated by the surgeon and is scheduled to undergo low back surgery next month. Has date scheduled.   - She is to continue her current dose of Percocet until surgery.  will continue percocet 7.5 tid prn pain. Takes 2-3 times/day.   - The surgeon will supply postoperative pain medications which she is to take for the appropriate time.  - If she needs more medication after the postoperative period she can follow-up with me we will discuss medication regimen but hopes that she will not need long-term narcotic therapy after surgery.  Discussed this with the patient also agrees.  - pain is slightly higher is spine than before. Radicular pain is not bothering her as much as before the TFESI. Can perform Epidural slightly higher lumbar spine that matches her radiating pain.  - Discussed with the patient regarding the etiology of their pain. Informed them that they would likely benefit from a L2/L3 lumbar epidural steroid injection.  The procedure was described in detail and the risks, benefits and alternatives were discussed with the patient (including but not limited to: bleeding, infection, nerve damage, worsening of pain, inability to perform injection, paralysis, seizures, and death) who agreed to proceed.    - Last UDS performed was reviewed and consistent.    - Continue gabapentin 300 mg 3 times a day.  Helping, and may need to be titrated off after surgery.  Hopefully she does not need this medication postoperatively.  - Discussed with the patient regarding long-term side effects of opioids including but not limited to dependence, addiction, sedation,  respiratory depression, opioid induced hormonal suppression, hyperalgesia, and elevated risk of myocardial infarction.        Wt Readings from Last 3 Encounters:   03/22/18 87.8 kg (193 lb 9.6 oz)   02/08/18 83 kg (183 lb)   12/28/17 81.6 kg (180 lb)     Body mass index is 34.29 kg/m². Patient counseled on the importance of weight loss to help with overall health and pain control. Patient instructed to attempt weight loss.   Plan: Calorie counting  reduce portion size and cut out extra servings    Follow-up as needed for pain - after surgery if needed.     Juana Summers MD  Pain Management     TRENT REPORT  As part of the patient's treatment plan, I am prescribing controlled substances. The patient has been made aware of appropriate use of such medications, including potential risk of somnolence, limited ability to drive and/or work safely, and the potential for dependence or overdose. It has also bee made clear that these medications are for use by this patient only, without concomitant use of alcohol or other substances unless prescribed.   Patient has completed prescribing agreement detailing terms of continued prescribing of controlled substances, including monitoring TRENT reports, urine drug screening, and pill counts if necessary. The patient is aware that inappropriate use will results in cessation of prescribing such medications.  TRENT report has been reviewed and scanned into the patient's chart.  History and physical exam exhibit continued safe and appropriate use of controlled substances.

## 2018-03-30 ENCOUNTER — TELEPHONE (OUTPATIENT)
Dept: PAIN MEDICINE | Facility: CLINIC | Age: 68
End: 2018-03-30

## 2018-04-11 ENCOUNTER — OFFICE (OUTPATIENT)
Dept: URBAN - METROPOLITAN AREA CLINIC 75 | Facility: CLINIC | Age: 68
End: 2018-04-11
Payer: COMMERCIAL

## 2018-04-11 VITALS
DIASTOLIC BLOOD PRESSURE: 76 MMHG | SYSTOLIC BLOOD PRESSURE: 128 MMHG | WEIGHT: 198 LBS | HEART RATE: 73 BPM | HEIGHT: 63 IN

## 2018-04-11 DIAGNOSIS — K57.92 DIVERTICULITIS OF INTESTINE, PART UNSPECIFIED, WITHOUT PERFO: ICD-10-CM

## 2018-04-11 DIAGNOSIS — Z86.010 PERSONAL HISTORY OF COLONIC POLYPS: ICD-10-CM

## 2018-04-11 DIAGNOSIS — K57.90 DIVERTICULOSIS OF INTESTINE, PART UNSPECIFIED, WITHOUT PERFO: ICD-10-CM

## 2018-04-11 PROCEDURE — 99204 OFFICE O/P NEW MOD 45 MIN: CPT | Performed by: NURSE PRACTITIONER

## 2018-04-28 ENCOUNTER — LAB REQUISITION (OUTPATIENT)
Dept: LAB | Facility: HOSPITAL | Age: 68
End: 2018-04-28

## 2018-04-28 DIAGNOSIS — Z00.00 ROUTINE GENERAL MEDICAL EXAMINATION AT A HEALTH CARE FACILITY: ICD-10-CM

## 2018-04-28 LAB
ANION GAP SERPL CALCULATED.3IONS-SCNC: 8.9 MMOL/L
BASOPHILS # BLD AUTO: 0.02 10*3/MM3 (ref 0–0.2)
BASOPHILS NFR BLD AUTO: 0.2 % (ref 0–1.5)
BUN BLD-MCNC: 9 MG/DL (ref 8–23)
BUN/CREAT SERPL: 19.1 (ref 7–25)
CALCIUM SPEC-SCNC: 8 MG/DL (ref 8.6–10.5)
CHLORIDE SERPL-SCNC: 93 MMOL/L (ref 98–107)
CO2 SERPL-SCNC: 33.1 MMOL/L (ref 22–29)
CREAT BLD-MCNC: 0.47 MG/DL (ref 0.57–1)
DEPRECATED RDW RBC AUTO: 46.5 FL (ref 37–54)
EOSINOPHIL # BLD AUTO: 0.33 10*3/MM3 (ref 0–0.7)
EOSINOPHIL NFR BLD AUTO: 3.4 % (ref 0.3–6.2)
ERYTHROCYTE [DISTWIDTH] IN BLOOD BY AUTOMATED COUNT: 12.8 % (ref 11.7–13)
GFR SERPL CREATININE-BSD FRML MDRD: 131 ML/MIN/1.73
GFR SERPL CREATININE-BSD FRML MDRD: >150 ML/MIN/1.73
GLUCOSE BLD-MCNC: 107 MG/DL (ref 65–99)
HCT VFR BLD AUTO: 29.6 % (ref 35.6–45.5)
HGB BLD-MCNC: 9.5 G/DL (ref 11.9–15.5)
IMM GRANULOCYTES # BLD: 0.03 10*3/MM3 (ref 0–0.03)
IMM GRANULOCYTES NFR BLD: 0.3 % (ref 0–0.5)
LYMPHOCYTES # BLD AUTO: 3.67 10*3/MM3 (ref 0.9–4.8)
LYMPHOCYTES NFR BLD AUTO: 37.8 % (ref 19.6–45.3)
MCH RBC QN AUTO: 31.8 PG (ref 26.9–32)
MCHC RBC AUTO-ENTMCNC: 32.1 G/DL (ref 32.4–36.3)
MCV RBC AUTO: 99 FL (ref 80.5–98.2)
MONOCYTES # BLD AUTO: 0.68 10*3/MM3 (ref 0.2–1.2)
MONOCYTES NFR BLD AUTO: 7 % (ref 5–12)
NEUTROPHILS # BLD AUTO: 5.01 10*3/MM3 (ref 1.9–8.1)
NEUTROPHILS NFR BLD AUTO: 51.6 % (ref 42.7–76)
PLATELET # BLD AUTO: 233 10*3/MM3 (ref 140–500)
PMV BLD AUTO: 9.3 FL (ref 6–12)
POTASSIUM BLD-SCNC: 4.1 MMOL/L (ref 3.5–5.2)
RBC # BLD AUTO: 2.99 10*6/MM3 (ref 3.9–5.2)
SODIUM BLD-SCNC: 135 MMOL/L (ref 136–145)
WBC NRBC COR # BLD: 9.71 10*3/MM3 (ref 4.5–10.7)

## 2018-04-28 PROCEDURE — 85025 COMPLETE CBC W/AUTO DIFF WBC: CPT

## 2018-04-28 PROCEDURE — 80048 BASIC METABOLIC PNL TOTAL CA: CPT

## 2018-06-05 DIAGNOSIS — G89.29 CHRONIC LEFT-SIDED LOW BACK PAIN WITH LEFT-SIDED SCIATICA: ICD-10-CM

## 2018-06-05 DIAGNOSIS — M54.42 CHRONIC LEFT-SIDED LOW BACK PAIN WITH LEFT-SIDED SCIATICA: ICD-10-CM

## 2018-06-05 NOTE — TELEPHONE ENCOUNTER
Medication Refill Request    Date of phone call: 6-4-18    Medication being requested: Oxycodone-apap 7.5-325 mg sig: Take 1 tablet by mouth 3 times a day as needed for severe pain  Qty: 90 tablets    Date of last visit: 3-22-18    Date of last refill: 3-22-18    TRENT up to date?: 3-21-18    Next Follow up?: not scheduled yet    Any new pertinent information? (i.e, new medication allergies, new use of medications, change in patient's health or condition, non-compliance or inconsistency with prescribing agreement?): Pt states she has 5 pain medicine pills left and she recently had back surgery (anterior and posterior) and had to go back in for collapsed bowel obstruction. She is still receiving home health nurse care and states the nurse thinks pain medication would be beneficial with continuing therapy. She is asking if you would be willing to refill this pain medicine and if she would need to  the script or if it could be refilled.

## 2018-06-07 RX ORDER — OXYCODONE AND ACETAMINOPHEN 7.5; 325 MG/1; MG/1
1 TABLET ORAL 3 TIMES DAILY PRN
Qty: 90 TABLET | Refills: 0 | OUTPATIENT
Start: 2018-06-07

## 2018-06-07 NOTE — TELEPHONE ENCOUNTER
Patient needs follow up. I will refuse. She recently had surgery and we need to discuss her pain requirements and current medication. She was told to follow up after surgery to discuss. She can schedule with APRN while I am gone next week if needed.

## 2018-06-08 ENCOUNTER — OFFICE VISIT (OUTPATIENT)
Dept: PAIN MEDICINE | Facility: CLINIC | Age: 68
End: 2018-06-08

## 2018-06-08 VITALS
SYSTOLIC BLOOD PRESSURE: 132 MMHG | BODY MASS INDEX: 33.13 KG/M2 | HEIGHT: 63 IN | DIASTOLIC BLOOD PRESSURE: 94 MMHG | OXYGEN SATURATION: 95 % | TEMPERATURE: 98 F | HEART RATE: 90 BPM | WEIGHT: 187 LBS | RESPIRATION RATE: 16 BRPM

## 2018-06-08 DIAGNOSIS — Z79.891 ENCOUNTER FOR MONITORING OPIOID MAINTENANCE THERAPY: ICD-10-CM

## 2018-06-08 DIAGNOSIS — Z51.81 ENCOUNTER FOR MONITORING OPIOID MAINTENANCE THERAPY: ICD-10-CM

## 2018-06-08 DIAGNOSIS — G89.29 CHRONIC LEFT-SIDED LOW BACK PAIN WITH LEFT-SIDED SCIATICA: ICD-10-CM

## 2018-06-08 DIAGNOSIS — M54.42 CHRONIC LEFT-SIDED LOW BACK PAIN WITH LEFT-SIDED SCIATICA: ICD-10-CM

## 2018-06-08 DIAGNOSIS — G89.29 OTHER CHRONIC PAIN: Primary | ICD-10-CM

## 2018-06-08 PROCEDURE — 99214 OFFICE O/P EST MOD 30 MIN: CPT | Performed by: NURSE PRACTITIONER

## 2018-06-08 RX ORDER — GABAPENTIN 300 MG/1
300 CAPSULE ORAL 3 TIMES DAILY
Qty: 90 CAPSULE | Refills: 1 | Status: SHIPPED | OUTPATIENT
Start: 2018-06-08 | End: 2018-06-08 | Stop reason: SDUPTHER

## 2018-06-08 RX ORDER — GABAPENTIN 300 MG/1
300 CAPSULE ORAL 3 TIMES DAILY
Qty: 90 CAPSULE | Refills: 1 | Status: SHIPPED | OUTPATIENT
Start: 2018-06-08 | End: 2018-09-21 | Stop reason: SDUPTHER

## 2018-06-08 RX ORDER — OXYCODONE AND ACETAMINOPHEN 7.5; 325 MG/1; MG/1
TABLET ORAL
Qty: 36 TABLET | Refills: 0 | Status: SHIPPED | OUTPATIENT
Start: 2018-06-08 | End: 2018-06-28 | Stop reason: SDUPTHER

## 2018-06-08 NOTE — PROGRESS NOTES
"CHIEF COMPLAINT  Pt states she is mildly improved with her LBP (fusion per Dr Gomez 4/25/18) but bilat. Leg/feet pain is \"gone\".    Subjective   Jocelyn Grimes is a 68 y.o. female  who presents to the office for follow-up.She has a history of chronic back pain.    Patient underwent anterior and posterior lumbar fusion with Dr. Gomez 4/25/2018 (fusion from L2-S1).  Reporting complete resolution of leg and feet pain.  Still reporting back pain of 6-8/10 severity.  Primary complaint is incisional discomfort.      Since surgery she has been taking Percocet 7.5/325 only 1/day lately because she was running low, this does help her to get through the day.  She has also been taking Gabapentin 300 mg daily, also was running out of medication and has been taking sparingly. No side effects with either medication.   Both seem to provide moderate pain reduction.      She reports that Norco does not do anything for her pain.  Reports that she has it listed as an allergy because it has never helped with her pain.      Back Pain   This is a chronic problem. The current episode started more than 1 year ago. The problem occurs daily. The problem has been gradually improving since onset. The pain is present in the lumbar spine. The quality of the pain is described as aching and burning. The pain does not radiate. The pain is at a severity of 6/10. The symptoms are aggravated by lying down and sitting. Associated symptoms include weakness (bilateral legs). Pertinent negatives include no bladder incontinence, bowel incontinence or numbness. She has tried NSAIDs and analgesics for the symptoms. The treatment provided moderate relief.      PEG Assessment   What number best describes your pain on average in the past week?8  What number best describes how, during the past week, pain has interfered with your enjoyment of life?8  What number best describes how, during the past week, pain has interfered with your general activity?  " "9    The following portions of the patient's history were reviewed and updated as appropriate: allergies, current medications, past family history, past medical history, past social history, past surgical history and problem list.    Review of Systems   Constitutional: Positive for fatigue. Activity change: restricted.   HENT: Negative for congestion.    Eyes: Negative for visual disturbance.   Respiratory: Positive for shortness of breath. Negative for cough and wheezing.    Cardiovascular: Negative.    Gastrointestinal: Positive for constipation. Negative for bowel incontinence, diarrhea and nausea.   Genitourinary: Negative for bladder incontinence and difficulty urinating.   Musculoskeletal: Positive for back pain. Negative for joint swelling (bilateral feet and ankles).   Neurological: Positive for weakness (bilateral legs). Negative for dizziness, light-headedness and numbness.   Psychiatric/Behavioral: Positive for sleep disturbance. Negative for suicidal ideas. The patient is not nervous/anxious.    All other systems reviewed and are negative.    Vitals:    06/08/18 1151   BP: 132/94   Pulse: 90   Resp: 16   Temp: 98 °F (36.7 °C)   SpO2: 95%   Weight: 84.8 kg (187 lb)   Height: 160 cm (62.99\")   PainSc: 6  Comment: LBP on R side ranges from 6-8/10   PainLoc: Back     Objective   Physical Exam   Constitutional: She is oriented to person, place, and time. She appears well-developed and well-nourished.   HENT:   Head: Normocephalic and atraumatic.   Eyes: Conjunctivae and EOM are normal.   Neck: Neck supple.   Cardiovascular: Normal rate.    Pulmonary/Chest: Effort normal. No respiratory distress.   Musculoskeletal:        Lumbar back: She exhibits tenderness and pain.   Neurological: She is alert and oriented to person, place, and time.   Skin: Skin is warm and dry.   Midline thoracolumbar and lower abdominal incisions both well healed    Psychiatric: She has a normal mood and affect. Her behavior is normal. "   Nursing note and vitals reviewed.    Assessment/Plan   Jocelyn was seen today for back pain.    Diagnoses and all orders for this visit:    Other chronic pain    Chronic left-sided low back pain with left-sided sciatica  -     oxyCODONE-acetaminophen (PERCOCET) 7.5-325 MG per tablet; Take 1-2 PO every 4 hours PRN pain  -     Discontinue: gabapentin (NEURONTIN) 300 MG capsule; Take 1 capsule by mouth 3 (Three) Times a Day.  -     gabapentin (NEURONTIN) 300 MG capsule; Take 1 capsule by mouth 3 (Three) Times a Day.    Encounter for monitoring opioid maintenance therapy      --- Refill Gabapentin  --- Refill temporary supply of Percocet. She hopes to continue to wean down as she moves away from the acute post-operative period.  Patient appears stable with current regimen. No adverse effects. Regarding continuation of opioids, there is no evidence of aberrant behavior or any red flags.  The patient continues with appropriate response to opioid therapy. ADL's remain intact by self.   --- The urine drug screen confirmation from 12/7/17 has been reviewed and the result is appropriate based on patient history and TRENT report  --- Follow-up 1 month          TRENT REPORT    As part of the patient's treatment plan, I am prescribing controlled substances. The patient has been made aware of appropriate use of such medications, including potential risk of somnolence, limited ability to drive and/or work safely, and the potential for dependence or overdose. It has also bee made clear that these medications are for use by this patient only, without concomitant use of alcohol or other substances unless prescribed.     Patient has completed prescribing agreement detailing terms of continued prescribing of controlled substances, including monitoring TRENT reports, urine drug screening, and pill counts if necessary. The patient is aware that inappropriate use will results in cessation of prescribing such medications.    TRENT  report has been reviewed and scanned into the patient's chart.    As the clinician, I personally reviewed the TRENT from 6/7/2018 while the patient was in the office today.    History and physical exam exhibit continued safe and appropriate use of controlled substances.    EMR Dragon/Transcription disclaimer:   Much of this encounter note is an electronic transcription/translation of spoken language to printed text. The electronic translation of spoken language may permit erroneous, or at times, nonsensical words or phrases to be inadvertently transcribed; Although I have reviewed the note for such errors, some may still exist.    INITIAL VISIT WITH ZUNILDA GORDON

## 2018-06-27 DIAGNOSIS — M54.42 CHRONIC LEFT-SIDED LOW BACK PAIN WITH LEFT-SIDED SCIATICA: ICD-10-CM

## 2018-06-27 DIAGNOSIS — G89.29 CHRONIC LEFT-SIDED LOW BACK PAIN WITH LEFT-SIDED SCIATICA: ICD-10-CM

## 2018-06-27 RX ORDER — OXYCODONE AND ACETAMINOPHEN 7.5; 325 MG/1; MG/1
TABLET ORAL
Qty: 36 TABLET | Refills: 0 | Status: CANCELLED | OUTPATIENT
Start: 2018-06-27

## 2018-06-27 NOTE — TELEPHONE ENCOUNTER
Medication Refill Request    Date of phone call: 18    Medication being requested: Oxycodone-apap 7.5 si-2 q4hrs PRN  Qty: 90    Date of last visit: 18    Date of last refill: 18 # 36 received    TRENT up to date?: 18    Next Follow up?: 18    Any new pertinent information? (i.e, new medication allergies, new use of medications, change in patient's health or condition, non-compliance or inconsistency with prescribing agreement?): n/a

## 2018-06-28 DIAGNOSIS — M54.42 CHRONIC LEFT-SIDED LOW BACK PAIN WITH LEFT-SIDED SCIATICA: ICD-10-CM

## 2018-06-28 DIAGNOSIS — G89.29 CHRONIC LEFT-SIDED LOW BACK PAIN WITH LEFT-SIDED SCIATICA: ICD-10-CM

## 2018-06-28 RX ORDER — OXYCODONE AND ACETAMINOPHEN 7.5; 325 MG/1; MG/1
1 TABLET ORAL DAILY PRN
Qty: 12 TABLET | Refills: 0 | Status: SHIPPED | OUTPATIENT
Start: 2018-06-28 | End: 2018-07-09

## 2018-07-09 ENCOUNTER — RESULTS ENCOUNTER (OUTPATIENT)
Dept: PAIN MEDICINE | Facility: CLINIC | Age: 68
End: 2018-07-09

## 2018-07-09 ENCOUNTER — OFFICE VISIT (OUTPATIENT)
Dept: PAIN MEDICINE | Facility: CLINIC | Age: 68
End: 2018-07-09

## 2018-07-09 VITALS
SYSTOLIC BLOOD PRESSURE: 111 MMHG | DIASTOLIC BLOOD PRESSURE: 73 MMHG | WEIGHT: 189.6 LBS | RESPIRATION RATE: 18 BRPM | BODY MASS INDEX: 33.59 KG/M2 | TEMPERATURE: 97.9 F | OXYGEN SATURATION: 95 % | HEART RATE: 78 BPM | HEIGHT: 63 IN

## 2018-07-09 DIAGNOSIS — Z51.81 ENCOUNTER FOR MONITORING OPIOID MAINTENANCE THERAPY: ICD-10-CM

## 2018-07-09 DIAGNOSIS — Z79.891 ENCOUNTER FOR MONITORING OPIOID MAINTENANCE THERAPY: ICD-10-CM

## 2018-07-09 DIAGNOSIS — M54.42 CHRONIC LEFT-SIDED LOW BACK PAIN WITH LEFT-SIDED SCIATICA: ICD-10-CM

## 2018-07-09 DIAGNOSIS — M54.42 CHRONIC LEFT-SIDED LOW BACK PAIN WITH LEFT-SIDED SCIATICA: Primary | ICD-10-CM

## 2018-07-09 DIAGNOSIS — G89.29 CHRONIC LEFT-SIDED LOW BACK PAIN WITH LEFT-SIDED SCIATICA: Primary | ICD-10-CM

## 2018-07-09 DIAGNOSIS — G57.02 PIRIFORMIS SYNDROME OF LEFT SIDE: ICD-10-CM

## 2018-07-09 DIAGNOSIS — G89.29 CHRONIC LEFT-SIDED LOW BACK PAIN WITH LEFT-SIDED SCIATICA: ICD-10-CM

## 2018-07-09 LAB
POC AMPHETAMINES: NEGATIVE
POC BARBITURATES: POSITIVE
POC BENZODIAZEPHINES: NEGATIVE
POC COCAINE: NEGATIVE
POC METHADONE: NEGATIVE
POC METHAMPHETAMINE SCREEN URINE: NEGATIVE
POC OPIATES: NEGATIVE
POC OXYCODONE: POSITIVE
POC PHENCYCLIDINE: NEGATIVE
POC PROPOXYPHENE: NEGATIVE
POC THC: NEGATIVE
POC TRICYCLIC ANTIDEPRESSANTS: POSITIVE

## 2018-07-09 PROCEDURE — 80305 DRUG TEST PRSMV DIR OPT OBS: CPT | Performed by: PAIN MEDICINE

## 2018-07-09 PROCEDURE — 99213 OFFICE O/P EST LOW 20 MIN: CPT | Performed by: PAIN MEDICINE

## 2018-07-09 RX ORDER — OXYCODONE HYDROCHLORIDE AND ACETAMINOPHEN 5; 325 MG/1; MG/1
TABLET ORAL
Qty: 45 TABLET | Refills: 0 | Status: SHIPPED | OUTPATIENT
Start: 2018-07-09 | End: 2018-09-21 | Stop reason: SDUPTHER

## 2018-07-09 NOTE — PROGRESS NOTES
CHIEF COMPLAINT: Back Pain    HPI  Jocelyn Grimes is a 68 y.o. female.  She is here to follow up for Back Pain    Jocelyn Grimes is a 68 y.o. female  who presents to the office for follow-up.    Since last visit their pain has remain unchanged. Patient underwent anterior and posterior lumbar fusion with Dr. Gomez 4/25/2018 (fusion from L2-S1).  Reporting complete resolution of leg and feet pain.  Still reporting low back pain and numbess. Continues to improve from surgery. Has had in home PT but plans to start [facility] next month. Has apt with surgeon later this month for follow up. We manage her post op pain medications. Has been able to cut back and trying to wean off. Taking avg 1 tablet/day. Wanting to wean off gabapentin.   The patient states their pain is a 5 on a scale of 1-10.  The patient describes this pain as constant dull and ache.  The pain is located in bilateral low back and does not radiate. This painful problem is aggravated by sitting for prolonged times, physical activity and is alleviated by pain medication and surgery.    Past pain medications:   Gabapentin - no help  Medrol dose pack - no help  Aleve prn - some help  Tramadol 50 mg qid prn - minimal help  norco 10/325 mg - helped  norco 7.5/325 mg - 1-2 tablets a day      Current pain medications:   Percocet 7.5/325 mg helping  mobic 15 mg daily - minimal help  Baclofen 10 mg      Past therapies:  Physical Therapy: no - not for LLE pain - for back pain years ago  Chiropractor: no  Massage Therapy: no  TENS: no  Neck or back surgery: yes - lumbar fusion 9/2015 and 4/25/2018 by Dr. Gomez  Past pain management: yes - saw Dr. Paulino prior to surgery 2015.      Previous Injection: left L5, S1 TFESI - 1/3/2018  Effect of Injection (%): 30-40%  Length of Relief: several weeks       Previous Injections: yes, pre lumbar fusion, total of 6 Lumbar ESIs, per Dr. Croft in 2015 with moderate relief  Effect of Injection (%): 60% to 30%   Length of  Relief: 3 months to 3 weeks.    PEG Assessment   What number best describes your pain on average in the past week? 5  What number best describes how, during the past week, pain has interfered with your enjoyment of life? 6  What number best describes how, during the past week, pain has interfered with your general activity? 6      Current Outpatient Prescriptions:   •  benazepril (LOTENSIN) 5 MG tablet, Take 5 mg by mouth Daily. as directed, Disp: , Rfl: 0  •  fluticasone (FLONASE) 50 MCG/ACT nasal spray, , Disp: , Rfl: 0  •  gabapentin (NEURONTIN) 300 MG capsule, Take 1 capsule by mouth 3 (Three) Times a Day., Disp: 90 capsule, Rfl: 1  •  meloxicam (MOBIC) 15 MG tablet, Take 15 mg by mouth Daily., Disp: , Rfl: 0  •  omega-3 acid ethyl esters (LOVAZA) 1 g capsule, Take 2 g by mouth., Disp: , Rfl:   •  pantoprazole (PROTONIX) 40 MG EC tablet, Take 40 mg by mouth., Disp: , Rfl:   •  oxyCODONE-acetaminophen (PERCOCET) 5-325 MG per tablet, Take 1 tablet po 1-2 times a day prn pain, Disp: 45 tablet, Rfl: 0    IMAGING  IMAGING        Imaging last reviewed: 07/09/18     PFSH:  The following portions of the patient's history were reviewed and updated as appropriate: problem list, past medical history, past surgery history, social history, family history, medications, and allergies    Review of Systems   Constitutional: Positive for fatigue.   HENT: Negative for congestion.    Eyes: Negative for visual disturbance.   Respiratory: Negative.    Cardiovascular: Negative.    Gastrointestinal: Negative for constipation and diarrhea.   Genitourinary: Negative for difficulty urinating.   Musculoskeletal: Positive for back pain and joint swelling (bilateral ankles).   Neurological: Positive for numbness. Negative for weakness.   Psychiatric/Behavioral: Positive for sleep disturbance. Negative for suicidal ideas. The patient is not nervous/anxious.    All other systems reviewed and are negative.      Vitals:    07/09/18 1357   BP:  "111/73   Pulse: 78   Resp: 18   Temp: 97.9 °F (36.6 °C)   SpO2: 95%   Weight: 86 kg (189 lb 9.6 oz)   Height: 160 cm (63\")   PainSc:   5   PainLoc: Back       Physical Exam   Constitutional: She is oriented to person, place, and time. She appears well-developed and well-nourished.   HENT:   Head: Normocephalic and atraumatic.   Eyes: Conjunctivae and EOM are normal.   Neck: Neck supple.   Cardiovascular: Normal rate.    Pulmonary/Chest: Effort normal. No respiratory distress.   Musculoskeletal:        Lumbar back: She exhibits tenderness and pain.   Neurological: She is alert and oriented to person, place, and time.   Skin: Skin is warm and dry.   Midline thoracolumbar and lower abdominal incisions both well healed    Psychiatric: She has a normal mood and affect. Her behavior is normal.   Nursing note and vitals reviewed.    Ortho Exam  Neurologic Exam     Mental Status   Oriented to person, place, and time.     Cranial Nerves     CN III, IV, VI   Extraocular motions are normal.       Lab Results   Component Value Date    POCMETH Negative 12/07/2017    POCAMPHET Negative 12/07/2017    POCBARBITUR Negative 12/07/2017    POCBENZO Positive 12/07/2017    POCCOCAINE Negative 12/07/2017    POCMETHADO Negative 12/07/2017    POCOPIATES Positive 12/07/2017    POCOXYCODO Negative 12/07/2017    POCPHENCYC Negative 12/07/2017    POCPROPOXY Negative 12/07/2017    POCTHC Negative 12/07/2017    POCTRICYC Negative 12/07/2017     Last UDS results reviewed: 07/09/18   Last UDS: 12/2017  Comments: inconsistent    Date of last TRENT reviewed : 07/09/18   Comments: Tyler Banks was seen today for back pain.    Diagnoses and all orders for this visit:    Chronic left-sided low back pain with left-sided sciatica    Piriformis syndrome of left side    Encounter for monitoring opioid maintenance therapy    Other orders  -     oxyCODONE-acetaminophen (PERCOCET) 5-325 MG per tablet; Take 1 tablet po 1-2 times a day prn " pain      Requested Prescriptions     Signed Prescriptions Disp Refills   • oxyCODONE-acetaminophen (PERCOCET) 5-325 MG per tablet 45 tablet 0     Sig: Take 1 tablet po 1-2 times a day prn pain     - continue Gabapentin but cut back to qhs only. Has RLS so will likely need one at night. No refills needed today.    - continue percocet but will decrease from 7.5 to 5 mg. Has taken #48 tablets over last 30 days, will decrease to 45 tablets for next month. She is going to try to wean off of these over next month.   -  Patient appears stable with current regimen. No adverse effects. Regarding continuation of opioids, there is no evidence of aberrant behavior or any red flags.  The patient continues with appropriate response to opioid therapy. ADL's remain intact by self.   - Random urine drug screen per office policy today, to be checked at next visit.   --- Follow-up as needed for pain   - applauded her efforts in weaning off pain medication.   - she is to start PT next month.   - follow up with surgeon as scheduled.     Wt Readings from Last 3 Encounters:   07/09/18 86 kg (189 lb 9.6 oz)   06/08/18 84.8 kg (187 lb)   03/22/18 87.8 kg (193 lb 9.6 oz)     Body mass index is 33.59 kg/m². Patient counseled on the importance of weight loss to help with overall health and pain control. Patient instructed to attempt weight loss.   Plan: Calorie counting  reduce portion size, cut out extra servings and reduce fast food intake    Follow-up as needed for pain     Juana Summers MD  Pain Management     [facility] REPORT  As part of the patient's treatment plan, I am prescribing controlled substances. The patient has been made aware of appropriate use of such medications, including potential risk of somnolence, limited ability to drive and/or work safely, and the potential for dependence or overdose. It has also bee made clear that these medications are for use by this patient only, without concomitant use of alcohol or other  substances unless prescribed.   Patient has completed prescribing agreement detailing terms of continued prescribing of controlled substances, including monitoring TRENT reports, urine drug screening, and pill counts if necessary. The patient is aware that inappropriate use will results in cessation of prescribing such medications.  TRENT report has been reviewed and scanned into the patient's chart.  History and physical exam exhibit continued safe and appropriate use of controlled substances.

## 2018-09-21 ENCOUNTER — OFFICE VISIT (OUTPATIENT)
Dept: PAIN MEDICINE | Facility: CLINIC | Age: 68
End: 2018-09-21

## 2018-09-21 VITALS
HEIGHT: 63 IN | HEART RATE: 60 BPM | BODY MASS INDEX: 33.66 KG/M2 | WEIGHT: 190 LBS | DIASTOLIC BLOOD PRESSURE: 86 MMHG | SYSTOLIC BLOOD PRESSURE: 120 MMHG | OXYGEN SATURATION: 96 % | RESPIRATION RATE: 16 BRPM | TEMPERATURE: 98 F

## 2018-09-21 DIAGNOSIS — G89.29 CHRONIC LEFT-SIDED LOW BACK PAIN WITH LEFT-SIDED SCIATICA: ICD-10-CM

## 2018-09-21 DIAGNOSIS — M54.50 CHRONIC BILATERAL LOW BACK PAIN WITHOUT SCIATICA: Primary | ICD-10-CM

## 2018-09-21 DIAGNOSIS — M54.42 CHRONIC LEFT-SIDED LOW BACK PAIN WITH LEFT-SIDED SCIATICA: ICD-10-CM

## 2018-09-21 DIAGNOSIS — G89.29 CHRONIC BILATERAL LOW BACK PAIN WITHOUT SCIATICA: Primary | ICD-10-CM

## 2018-09-21 PROCEDURE — 99213 OFFICE O/P EST LOW 20 MIN: CPT | Performed by: PAIN MEDICINE

## 2018-09-21 RX ORDER — GABAPENTIN 300 MG/1
300 CAPSULE ORAL 3 TIMES DAILY
Qty: 90 CAPSULE | Refills: 2 | Status: SHIPPED | OUTPATIENT
Start: 2018-09-21

## 2018-09-21 RX ORDER — OXYCODONE HYDROCHLORIDE AND ACETAMINOPHEN 5; 325 MG/1; MG/1
TABLET ORAL
Qty: 45 TABLET | Refills: 0 | Status: SHIPPED | OUTPATIENT
Start: 2018-09-21 | End: 2018-12-14 | Stop reason: SDUPTHER

## 2018-09-21 NOTE — PROGRESS NOTES
"CHIEF COMPLAINT: Back Pain    HPI  Jocelyn Grimes is a 68 y.o. female.  She is here to follow up for Back Pain    Jocelyn Grimes is a 68 y.o. female  who presents to the office for follow-up. Since last visit their pain has improved. Patient underwent anterior and posterior lumbar fusion with Dr. Gomez 4/25/2018 (fusion from L2-S1). Pt complains today about \"general aching\" as well.  Since last visit has started PT. Currently going. Slowly getting stronger and having less leg pain.     The patient states their pain is a 5 on a scale of 1-10.  The patient describes this pain as constant dull and ache.  The pain is located in bilateral low back and does not radiate. This painful problem is aggravated by standing up after laying down, physical activity, twisting and bending and is alleviated by pain medication and relaxation.    Past pain medications:   Gabapentin - no help  Medrol dose pack - no help  Aleve prn - some help  Tramadol 50 mg qid prn - minimal help  norco 10/325 mg - helped  norco 7.5/325 mg - 1-2 tablets a day      Current pain medications:   Percocet 7.5/325 mg helping  mobic 15 mg daily - minimal help  Baclofen 10 mg      Past therapies:  Physical Therapy: no - not for LLE pain - for back pain years ago  Chiropractor: no  Massage Therapy: no  TENS: no  Neck or back surgery: yes - lumbar fusion 9/2015 and 4/25/2018 by Dr. Gomez  Past pain management: yes - saw Dr. Paulino prior to surgery 2015.      Previous Injection: left L5, S1 TFESI - 1/3/2018  Effect of Injection (%): 30-40%  Length of Relief: several weeks       Previous Injections: yes, pre lumbar fusion, total of 6 Lumbar ESIs, per Dr. Croft in 2015 with moderate relief  Effect of Injection (%): 60% to 30%   Length of Relief: 3 months to 3 weeks.    PEG Assessment   What number best describes your pain on average in the past week? 4  What number best describes how, during the past week, pain has interfered with your enjoyment of life? " "7  What number best describes how, during the past week, pain has interfered with your general activity? 8      Current Outpatient Prescriptions:   •  benazepril (LOTENSIN) 5 MG tablet, Take 5 mg by mouth Daily. as directed, Disp: , Rfl: 0  •  fluticasone (FLONASE) 50 MCG/ACT nasal spray, , Disp: , Rfl: 0  •  gabapentin (NEURONTIN) 300 MG capsule, Take 1 capsule by mouth 3 (Three) Times a Day., Disp: 90 capsule, Rfl: 2  •  meloxicam (MOBIC) 15 MG tablet, Take 15 mg by mouth Daily., Disp: , Rfl: 0  •  omega-3 acid ethyl esters (LOVAZA) 1 g capsule, Take 2 g by mouth., Disp: , Rfl:   •  oxyCODONE-acetaminophen (PERCOCET) 5-325 MG per tablet, Take 1 tablet po 1-2 times a day prn pain, Disp: 45 tablet, Rfl: 0  •  pantoprazole (PROTONIX) 40 MG EC tablet, Take 40 mg by mouth., Disp: , Rfl:     IMAGING      Imaging last reviewed: 09/21/18     PFSH:  The following portions of the patient's history were reviewed and updated as appropriate: problem list, past medical history, past surgery history, social history, family history, medications, and allergies    Review of Systems   Constitutional: Positive for fatigue.   HENT: Positive for hearing loss. Negative for congestion.    Eyes: Negative for visual disturbance.   Respiratory: Negative.    Cardiovascular: Negative.    Gastrointestinal: Negative for constipation, diarrhea and nausea.   Genitourinary: Negative for difficulty urinating.   Musculoskeletal: Positive for back pain and joint swelling (bilateral ankles).   Neurological: Negative for weakness and numbness.   Psychiatric/Behavioral: Positive for sleep disturbance. Negative for suicidal ideas. The patient is not nervous/anxious.    All other systems reviewed and are negative.      Vitals:    09/21/18 1313   BP: 120/86   Pulse: 60   Resp: 16   Temp: 98 °F (36.7 °C)   SpO2: 96%   Weight: 86.2 kg (190 lb)   Height: 160 cm (62.99\")   PainSc: 5  Comment: LBP ranges from 4-8/10   PainLoc: Back       Physical Exam "   Constitutional: She is oriented to person, place, and time. She appears well-developed and well-nourished.   HENT:   Head: Normocephalic and atraumatic.   Eyes: Conjunctivae and EOM are normal.   Neck: Neck supple.   Cardiovascular: Normal rate.    Pulmonary/Chest: Effort normal. No respiratory distress.   Musculoskeletal:        Lumbar back: She exhibits decreased range of motion, tenderness and pain.   Neurological: She is alert and oriented to person, place, and time.   Skin: Skin is warm and dry.   Midline thoracolumbar and lower abdominal incisions both well healed    Psychiatric: She has a normal mood and affect. Her behavior is normal.   Nursing note and vitals reviewed.    Ortho Exam  Neurologic Exam     Mental Status   Oriented to person, place, and time.     Cranial Nerves     CN III, IV, VI   Extraocular motions are normal.       Lab Results   Component Value Date    POCMETH Negative 07/09/2018    POCAMPHET Negative 07/09/2018    POCBARBITUR Positive (A) 07/09/2018    POCBENZO Negative 07/09/2018    POCCOCAINE Negative 07/09/2018    POCMETHADO Negative 07/09/2018    POCOPIATES Negative 07/09/2018    POCOXYCODO Positive (A) 07/09/2018    POCPHENCYC Negative 07/09/2018    POCPROPOXY Negative 07/09/2018    POCTHC Negative 07/09/2018    POCTRICYC Positive (A) 07/09/2018     Last UDS results reviewed: 09/21/18   Last UDS: 12/2017  Comments: inconsistent    Date of last TRENT reviewed : 09/21/18   Comments: Tyler Banks was seen today for back pain.    Diagnoses and all orders for this visit:    Chronic bilateral low back pain without sciatica    Chronic left-sided low back pain with left-sided sciatica  -     gabapentin (NEURONTIN) 300 MG capsule; Take 1 capsule by mouth 3 (Three) Times a Day.    Other orders  -     oxyCODONE-acetaminophen (PERCOCET) 5-325 MG per tablet; Take 1 tablet po 1-2 times a day prn pain      Requested Prescriptions     Signed Prescriptions Disp Refills   •  oxyCODONE-acetaminophen (PERCOCET) 5-325 MG per tablet 45 tablet 0     Sig: Take 1 tablet po 1-2 times a day prn pain   • gabapentin (NEURONTIN) 300 MG capsule 90 capsule 2     Sig: Take 1 capsule by mouth 3 (Three) Times a Day.     - continues to improve after surgery. Using percocet less and less over last couple months. Made #45 tablets last 2.5 months. Using sparingly. Continues to wean off.   - continue Gabapentin - has cut back to 1-2/day.   - continue percocet at lower dose of 5 mg. norco doesn't help so will step off from percocet.   - Last UDS performed was reviewed and consistent.  Last visit performed UDS but no confirmation in system - will call and obtain.    - Follow-up as needed for pain- hopefully she does not need me for a long time if ever.   - applauded her efforts in weaning off pain medication.   - continue PT  - follow up with surgeon as scheduled.     Wt Readings from Last 3 Encounters:   09/21/18 86.2 kg (190 lb)   07/09/18 86 kg (189 lb 9.6 oz)   06/08/18 84.8 kg (187 lb)     Body mass index is 33.67 kg/m². Patient counseled on the importance of weight loss to help with overall health and pain control. Patient instructed to attempt weight loss.   Plan: Calorie counting  reduce fast food intake    Follow-up as needed for pain     Juana Summers MD  Pain Management     TRENT REPORT  As part of the patient's treatment plan, I am prescribing controlled substances. The patient has been made aware of appropriate use of such medications, including potential risk of somnolence, limited ability to drive and/or work safely, and the potential for dependence or overdose. It has also bee made clear that these medications are for use by this patient only, without concomitant use of alcohol or other substances unless prescribed.   Patient has completed prescribing agreement detailing terms of continued prescribing of controlled substances, including monitoring TRENT reports, urine drug screening,  and pill counts if necessary. The patient is aware that inappropriate use will results in cessation of prescribing such medications.  TRENT report has been reviewed and scanned into the patient's chart.  History and physical exam exhibit continued safe and appropriate use of controlled substances.

## 2018-12-14 ENCOUNTER — OFFICE VISIT (OUTPATIENT)
Dept: PAIN MEDICINE | Facility: CLINIC | Age: 68
End: 2018-12-14

## 2018-12-14 VITALS
BODY MASS INDEX: 33.91 KG/M2 | TEMPERATURE: 97.8 F | RESPIRATION RATE: 15 BRPM | OXYGEN SATURATION: 96 % | SYSTOLIC BLOOD PRESSURE: 124 MMHG | HEART RATE: 63 BPM | WEIGHT: 191.4 LBS | HEIGHT: 63 IN | DIASTOLIC BLOOD PRESSURE: 82 MMHG

## 2018-12-14 DIAGNOSIS — M79.18 MYOFASCIAL PAIN: ICD-10-CM

## 2018-12-14 DIAGNOSIS — G57.02 PIRIFORMIS SYNDROME OF LEFT SIDE: ICD-10-CM

## 2018-12-14 DIAGNOSIS — Z51.81 ENCOUNTER FOR MONITORING OPIOID MAINTENANCE THERAPY: ICD-10-CM

## 2018-12-14 DIAGNOSIS — Z79.891 ENCOUNTER FOR MONITORING OPIOID MAINTENANCE THERAPY: ICD-10-CM

## 2018-12-14 DIAGNOSIS — M54.50 CHRONIC BILATERAL LOW BACK PAIN WITHOUT SCIATICA: Primary | ICD-10-CM

## 2018-12-14 DIAGNOSIS — G89.29 CHRONIC BILATERAL LOW BACK PAIN WITHOUT SCIATICA: Primary | ICD-10-CM

## 2018-12-14 PROCEDURE — 99213 OFFICE O/P EST LOW 20 MIN: CPT | Performed by: PAIN MEDICINE

## 2018-12-14 RX ORDER — DESVENLAFAXINE 100 MG/1
TABLET, EXTENDED RELEASE ORAL DAILY
COMMUNITY
Start: 2018-10-18

## 2018-12-14 RX ORDER — CYCLOBENZAPRINE HCL 10 MG
10 TABLET ORAL 3 TIMES DAILY
Qty: 90 TABLET | Refills: 1 | Status: SHIPPED | OUTPATIENT
Start: 2018-12-14 | End: 2019-03-28

## 2018-12-14 RX ORDER — TRAZODONE HYDROCHLORIDE 50 MG/1
TABLET ORAL
Refills: 5 | COMMUNITY
Start: 2018-10-18

## 2018-12-14 RX ORDER — OXYCODONE HYDROCHLORIDE AND ACETAMINOPHEN 5; 325 MG/1; MG/1
TABLET ORAL
Qty: 20 TABLET | Refills: 0 | Status: SHIPPED | OUTPATIENT
Start: 2018-12-14 | End: 2019-03-28 | Stop reason: SDUPTHER

## 2018-12-14 RX ORDER — POLYETHYLENE GLYCOL 3350 17 G/17G
POWDER, FOR SOLUTION ORAL
Refills: 3 | COMMUNITY
Start: 2018-10-18

## 2018-12-14 RX ORDER — AMLODIPINE BESYLATE AND BENAZEPRIL HYDROCHLORIDE 5; 10 MG/1; MG/1
CAPSULE ORAL DAILY
COMMUNITY
Start: 2018-07-27

## 2018-12-14 NOTE — PROGRESS NOTES
CHIEF COMPLAINT: Back Pain    HPI  Jocelyn Grimes is a 68 y.o. female.  She is here to follow up for Back Pain    Jocelyn Grimes is a 68 y.o. female  who presents to the office for follow-up.    Since last visit their pain has worsened. Having spasms in right side of low back over last several months. Had ER visit 10/12/2018 for increased muscle spasms. Updated imaging performed. Nothing had changed on imaging.  Sent home with valium and percocet - helped. Muscle spasms occur approx 3/week. Can not correlate with any certain event - maybe driving long distances. PT trying to help but still bothering her.   Patient underwent anterior and posterior lumbar fusion with Dr. Gomez 4/25/2018 (fusion from L2-S1). currently going to PT - helping with ROM of low back.     The patient states their pain is a 7 on a scale of 1-10.  The patient describes this pain as constant dull and ache.  The pain is located in right side low back and does not radiate. This painful problem is aggravated by standing up after laying down, physical activity, twisting and bending and is alleviated by pain medication and relaxation.    Past pain medications:   Gabapentin - no help  Medrol dose pack - no help  Aleve prn - some help  Tramadol 50 mg qid prn - minimal help  norco 10/325 mg - helped  norco 7.5/325 mg - 1-2 tablets a day      Current pain medications:   Percocet 7.5/325 mg helping  mobic 15 mg daily - minimal help      Past therapies:  Physical Therapy: no - not for LLE pain - for back pain years ago  Chiropractor: no  Massage Therapy: no  TENS: no  Neck or back surgery: yes - lumbar fusion 9/2015 and 4/25/2018 by Dr. Gomez  Past pain management: yes - saw Dr. Paulino prior to surgery 2015.      Previous Injection: left L5, S1 TFESI - 1/3/2018  Effect of Injection (%): 30-40%  Length of Relief: several weeks    PEG Assessment   What number best describes your pain on average in the past week? 5  What number best describes how,  during the past week, pain has interfered with your enjoyment of life? 6  What number best describes how, during the past week, pain has interfered with your general activity? 7      Current Outpatient Medications:   •  amLODIPine-benazepril (LOTREL 5-10) 5-10 MG per capsule, Daily., Disp: , Rfl:   •  desvenlafaxine (PRISTIQ) 100 MG 24 hr tablet, Daily., Disp: , Rfl:   •  fluticasone (FLONASE) 50 MCG/ACT nasal spray, , Disp: , Rfl: 0  •  gabapentin (NEURONTIN) 300 MG capsule, Take 1 capsule by mouth 3 (Three) Times a Day. (Patient taking differently: Take 300 mg by mouth 3 (Three) Times a Day As Needed.), Disp: 90 capsule, Rfl: 2  •  meloxicam (MOBIC) 15 MG tablet, Take 15 mg by mouth Daily., Disp: , Rfl: 0  •  omega-3 acid ethyl esters (LOVAZA) 1 g capsule, Take 2 g by mouth., Disp: , Rfl:   •  oxyCODONE-acetaminophen (PERCOCET) 5-325 MG per tablet, Take 1 tablet po 1-2 times a day prn pain, Disp: 20 tablet, Rfl: 0  •  pantoprazole (PROTONIX) 40 MG EC tablet, Take 40 mg by mouth Daily As Needed., Disp: , Rfl:   •  polyethylene glycol (MIRALAX) powder, MIX 1 CAPFUL IN 8 OUNCES OF WATER AND DRINK DAILY AS DIRECTED., Disp: , Rfl: 3  •  traZODone (DESYREL) 50 MG tablet, TAKE 1 TABLET BY MOUTH EVERYDAY AT BEDTIME, Disp: , Rfl: 5  •  cyclobenzaprine (FLEXERIL) 10 MG tablet, Take 1 tablet by mouth 3 (Three) Times a Day., Disp: 90 tablet, Rfl: 1    IMAGING        Imaging last reviewed: 12/14/18     PFSH:  The following portions of the patient's history were reviewed and updated as appropriate: problem list, past medical history, past surgery history, social history, family history, medications, and allergies    Review of Systems   Constitutional: Positive for fatigue. Negative for chills and fever.   HENT: Positive for hearing loss. Negative for congestion.    Eyes: Negative for visual disturbance.   Respiratory: Negative.    Cardiovascular: Negative.    Gastrointestinal: Negative for constipation, diarrhea and nausea.  "  Genitourinary: Negative for difficulty urinating.   Musculoskeletal: Positive for back pain, joint swelling (bilateral ankles) and myalgias.   Neurological: Positive for numbness (in back). Negative for dizziness, weakness, light-headedness and headaches.   Psychiatric/Behavioral: Positive for sleep disturbance. Negative for suicidal ideas. The patient is nervous/anxious.    All other systems reviewed and are negative.      Vitals:    12/14/18 1303   BP: 124/82   Pulse: 63   Resp: 15   Temp: 97.8 °F (36.6 °C)   SpO2: 96%   Weight: 86.8 kg (191 lb 6.4 oz)   Height: 160 cm (62.99\")   PainSc:   7   PainLoc: Back       Physical Exam   Constitutional: She is oriented to person, place, and time. She appears well-developed and well-nourished.   HENT:   Head: Normocephalic and atraumatic.   Eyes: Conjunctivae and EOM are normal.   Neck: Neck supple.   Cardiovascular: Normal rate.   Pulmonary/Chest: Effort normal. No respiratory distress.   Musculoskeletal:        Lumbar back: She exhibits decreased range of motion, tenderness and pain.   Neurological: She is alert and oriented to person, place, and time.   Skin: Skin is warm and dry.   Psychiatric: She has a normal mood and affect. Her behavior is normal.   Nursing note and vitals reviewed.    Ortho Exam  Neurologic Exam     Mental Status   Oriented to person, place, and time.     Cranial Nerves     CN III, IV, VI   Extraocular motions are normal.       Lab Results   Component Value Date    POCMETH Negative 07/09/2018    POCAMPHET Negative 07/09/2018    POCBARBITUR Positive (A) 07/09/2018    POCBENZO Negative 07/09/2018    POCCOCAINE Negative 07/09/2018    POCMETHADO Negative 07/09/2018    POCOPIATES Negative 07/09/2018    POCOXYCODO Positive (A) 07/09/2018    POCPHENCYC Negative 07/09/2018    POCPROPOXY Negative 07/09/2018    POCTHC Negative 07/09/2018    POCTRICYC Positive (A) 07/09/2018     Last UDS results reviewed: 12/14/18   Last UDS: 7/2018  Comments: Consistent "       Date of last TRENT reviewed : 12/14/18   Comments: Tyler Banks was seen today for back pain.    Diagnoses and all orders for this visit:    Chronic bilateral low back pain without sciatica    Piriformis syndrome of left side    Encounter for monitoring opioid maintenance therapy    Myofascial pain    Other orders  -     oxyCODONE-acetaminophen (PERCOCET) 5-325 MG per tablet; Take 1 tablet po 1-2 times a day prn pain  -     cyclobenzaprine (FLEXERIL) 10 MG tablet; Take 1 tablet by mouth 3 (Three) Times a Day.      Requested Prescriptions     Signed Prescriptions Disp Refills   • oxyCODONE-acetaminophen (PERCOCET) 5-325 MG per tablet 20 tablet 0     Sig: Take 1 tablet po 1-2 times a day prn pain   • cyclobenzaprine (FLEXERIL) 10 MG tablet 90 tablet 1     Sig: Take 1 tablet by mouth 3 (Three) Times a Day.     - continues to improve after surgery. Using percocet sparingly. Will give small amount. - applauded her efforts in weaning off pain medication.  - for her muscles spasms - would likely benefit from TPI - she can not perform today, she will call back to schedule - she does not need a full apt - can schedule as injection only - overbook if needed.   - in the mean time I will give muscle relaxer to try - tried flexeril 5 mg in past with no benefit - will give 10 mg to try. Patient instructed regarding side effects and the need to avoid driving until they know how the medication changes affect them.     - Last UDS performed was reviewed and consistent.    - continue PT    Wt Readings from Last 3 Encounters:   12/14/18 86.8 kg (191 lb 6.4 oz)   09/21/18 86.2 kg (190 lb)   07/09/18 86 kg (189 lb 9.6 oz)     Body mass index is 33.91 kg/m². Patient counseled on the importance of weight loss to help with overall health and pain control. Patient instructed to attempt weight loss.   Plan: Calorie counting  reduce screen time and reduce portion size    Follow-up for injectoin.     Juana Summers,  MD  Pain Management     TRENT REPORT  As part of the patient's treatment plan, I am prescribing controlled substances. The patient has been made aware of appropriate use of such medications, including potential risk of somnolence, limited ability to drive and/or work safely, and the potential for dependence or overdose. It has also bee made clear that these medications are for use by this patient only, without concomitant use of alcohol or other substances unless prescribed.   Patient has completed prescribing agreement detailing terms of continued prescribing of controlled substances, including monitoring TRENT reports, urine drug screening, and pill counts if necessary. The patient is aware that inappropriate use will results in cessation of prescribing such medications.  TRENT report has been reviewed and scanned into the patient's chart.  History and physical exam exhibit continued safe and appropriate use of controlled substances.

## 2019-01-10 ENCOUNTER — PROCEDURE VISIT (OUTPATIENT)
Dept: PAIN MEDICINE | Facility: CLINIC | Age: 69
End: 2019-01-10

## 2019-01-10 VITALS
RESPIRATION RATE: 16 BRPM | OXYGEN SATURATION: 98 % | HEART RATE: 68 BPM | SYSTOLIC BLOOD PRESSURE: 133 MMHG | TEMPERATURE: 98.8 F | BODY MASS INDEX: 33.91 KG/M2 | WEIGHT: 191.38 LBS | HEIGHT: 63 IN | DIASTOLIC BLOOD PRESSURE: 91 MMHG

## 2019-01-10 DIAGNOSIS — G89.29 CHRONIC BILATERAL LOW BACK PAIN WITHOUT SCIATICA: Primary | ICD-10-CM

## 2019-01-10 DIAGNOSIS — M54.50 CHRONIC BILATERAL LOW BACK PAIN WITHOUT SCIATICA: Primary | ICD-10-CM

## 2019-01-10 DIAGNOSIS — M62.830 MUSCLE SPASM OF BACK: ICD-10-CM

## 2019-01-10 PROCEDURE — 20553 NJX 1/MLT TRIGGER POINTS 3/>: CPT | Performed by: PAIN MEDICINE

## 2019-01-10 RX ORDER — TIZANIDINE 4 MG/1
2-4 TABLET ORAL 3 TIMES DAILY PRN
Qty: 90 TABLET | Refills: 2 | Status: SHIPPED | OUTPATIENT
Start: 2019-01-10 | End: 2019-03-28

## 2019-01-10 RX ORDER — LIDOCAINE 50 MG/G
1 PATCH TOPICAL EVERY 24 HOURS
Qty: 1 PATCH | Refills: 3 | Status: SHIPPED | OUTPATIENT
Start: 2019-01-10

## 2019-01-10 NOTE — PROGRESS NOTES
"CHIEF COMPLAINT: No chief complaint on file.      Jocelyn Grimes is a 68 y.o. female.  She is here for the following procedure:  Trigger point injections for acute flare up of left sided low back muscle spasms.   Had left boot placed on left foot 3 days ago- and throwing off low back. Having more low back spasms. Taking flexeril without benefit.     Vitals:    01/10/19 1337   BP: 133/91   Pulse: 68   Resp: 16   Temp: 98.8 °F (37.1 °C)   SpO2: 98%   Weight: 86.8 kg (191 lb 6 oz)   Height: 160 cm (62.99\")   PainSc:   9   PainLoc: Back       Bupivacaine Lot#: [id number] Exp: 8/2021  Depo Medrol 40 mg Lot#: [id number] Exp: 5/2020      Trigger point  Date/Time: 1/10/2019 1:54 PM  Performed by: Juana Summers MD  Authorized by: Juana Summers MD   Consent: Verbal consent obtained. Written consent obtained.  Risks and benefits: risks, benefits and alternatives were discussed  Consent given by: patient  Patient understanding: patient states understanding of the procedure being performed  Patient consent: the patient's understanding of the procedure matches consent given  Patient identity confirmed: verbally with patient  Time out: Immediately prior to procedure a \"time out\" was called to verify the correct patient, procedure, equipment, support staff and site/side marked as required.  Indications: pain relief  Laterality: left  Needle size: 25 G  Location technique: anatomical landmarks  Local Anesthetic: bupivacaine 0.5% without epinephrine  Anesthetic total: 10 mL  Outcome: pain unchanged  Patient tolerance: Patient tolerated the procedure well with no immediate complications       Trigger Point Injections:  5 injection sites over left lower lumbar region: latissimus dorsi, paraspinal muscles, erector spinae    Visit Diagnoses:  1. Chronic bilateral low back pain without sciatica    2. Muscle spasm of back      - samples of lidocaine patches and flector patches given.   - samples of lorzone for muscle spasms. "   - will switch from flexeril to tizanidine to see if she can get any more pain relief.       Juana Summers MD  Pain Management

## 2019-01-16 ENCOUNTER — PRIOR AUTHORIZATION (OUTPATIENT)
Dept: PAIN MEDICINE | Facility: CLINIC | Age: 69
End: 2019-01-16

## 2019-03-07 ENCOUNTER — HOSPITAL ENCOUNTER (OUTPATIENT)
Facility: HOSPITAL | Age: 69
Setting detail: HOSPITAL OUTPATIENT SURGERY
End: 2019-03-07
Attending: ORTHOPAEDIC SURGERY | Admitting: ORTHOPAEDIC SURGERY

## 2019-03-22 VITALS
SYSTOLIC BLOOD PRESSURE: 119 MMHG | HEIGHT: 63 IN | RESPIRATION RATE: 17 BRPM | OXYGEN SATURATION: 99 % | SYSTOLIC BLOOD PRESSURE: 127 MMHG | SYSTOLIC BLOOD PRESSURE: 134 MMHG | RESPIRATION RATE: 12 BRPM | SYSTOLIC BLOOD PRESSURE: 136 MMHG | DIASTOLIC BLOOD PRESSURE: 67 MMHG | OXYGEN SATURATION: 95 % | DIASTOLIC BLOOD PRESSURE: 102 MMHG | SYSTOLIC BLOOD PRESSURE: 124 MMHG | OXYGEN SATURATION: 97 % | DIASTOLIC BLOOD PRESSURE: 83 MMHG | DIASTOLIC BLOOD PRESSURE: 85 MMHG | SYSTOLIC BLOOD PRESSURE: 158 MMHG | DIASTOLIC BLOOD PRESSURE: 72 MMHG | OXYGEN SATURATION: 96 % | SYSTOLIC BLOOD PRESSURE: 137 MMHG | DIASTOLIC BLOOD PRESSURE: 73 MMHG | HEART RATE: 89 BPM | RESPIRATION RATE: 14 BRPM | HEART RATE: 79 BPM | DIASTOLIC BLOOD PRESSURE: 100 MMHG | TEMPERATURE: 98.6 F | HEART RATE: 72 BPM | DIASTOLIC BLOOD PRESSURE: 75 MMHG | HEART RATE: 74 BPM | TEMPERATURE: 98.8 F | DIASTOLIC BLOOD PRESSURE: 92 MMHG | SYSTOLIC BLOOD PRESSURE: 117 MMHG | HEART RATE: 82 BPM | HEART RATE: 80 BPM | SYSTOLIC BLOOD PRESSURE: 122 MMHG | RESPIRATION RATE: 26 BRPM | RESPIRATION RATE: 21 BRPM | HEART RATE: 83 BPM | WEIGHT: 189 LBS | OXYGEN SATURATION: 94 %

## 2019-03-25 ENCOUNTER — AMBULATORY SURGICAL CENTER (OUTPATIENT)
Dept: URBAN - METROPOLITAN AREA SURGERY 17 | Facility: SURGERY | Age: 69
End: 2019-03-25
Payer: COMMERCIAL

## 2019-03-25 ENCOUNTER — OFFICE (OUTPATIENT)
Dept: URBAN - METROPOLITAN AREA PATHOLOGY 4 | Facility: PATHOLOGY | Age: 69
End: 2019-03-25
Payer: COMMERCIAL

## 2019-03-25 ENCOUNTER — APPOINTMENT (OUTPATIENT)
Dept: PREADMISSION TESTING | Facility: HOSPITAL | Age: 69
End: 2019-03-25

## 2019-03-25 DIAGNOSIS — K62.89 OTHER SPECIFIED DISEASES OF ANUS AND RECTUM: ICD-10-CM

## 2019-03-25 DIAGNOSIS — Z86.010 PERSONAL HISTORY OF COLONIC POLYPS: ICD-10-CM

## 2019-03-25 DIAGNOSIS — K57.30 DIVERTICULOSIS OF LARGE INTESTINE WITHOUT PERFORATION OR ABS: ICD-10-CM

## 2019-03-25 DIAGNOSIS — D12.4 BENIGN NEOPLASM OF DESCENDING COLON: ICD-10-CM

## 2019-03-25 LAB
GI HISTOLOGY: A. UNSPECIFIED: (no result)
GI HISTOLOGY: PDF REPORT: (no result)

## 2019-03-25 PROCEDURE — 45385 COLONOSCOPY W/LESION REMOVAL: CPT | Mod: PT

## 2019-03-25 PROCEDURE — 88305 TISSUE EXAM BY PATHOLOGIST: CPT

## 2019-03-27 ENCOUNTER — APPOINTMENT (OUTPATIENT)
Dept: PREADMISSION TESTING | Facility: HOSPITAL | Age: 69
End: 2019-03-27

## 2019-03-28 ENCOUNTER — OFFICE VISIT (OUTPATIENT)
Dept: PAIN MEDICINE | Facility: CLINIC | Age: 69
End: 2019-03-28

## 2019-03-28 VITALS
RESPIRATION RATE: 16 BRPM | OXYGEN SATURATION: 94 % | DIASTOLIC BLOOD PRESSURE: 93 MMHG | BODY MASS INDEX: 34.69 KG/M2 | WEIGHT: 195.8 LBS | TEMPERATURE: 98.3 F | HEART RATE: 85 BPM | HEIGHT: 63 IN | SYSTOLIC BLOOD PRESSURE: 141 MMHG

## 2019-03-28 DIAGNOSIS — M54.50 CHRONIC BILATERAL LOW BACK PAIN WITHOUT SCIATICA: ICD-10-CM

## 2019-03-28 DIAGNOSIS — G89.29 CHRONIC BILATERAL LOW BACK PAIN WITHOUT SCIATICA: ICD-10-CM

## 2019-03-28 DIAGNOSIS — Z51.81 ENCOUNTER FOR MONITORING OPIOID MAINTENANCE THERAPY: ICD-10-CM

## 2019-03-28 DIAGNOSIS — G89.29 OTHER CHRONIC PAIN: Primary | ICD-10-CM

## 2019-03-28 DIAGNOSIS — Z79.891 ENCOUNTER FOR MONITORING OPIOID MAINTENANCE THERAPY: ICD-10-CM

## 2019-03-28 PROCEDURE — 99213 OFFICE O/P EST LOW 20 MIN: CPT | Performed by: NURSE PRACTITIONER

## 2019-03-28 RX ORDER — CHLORZOXAZONE 500 MG/1
500 TABLET ORAL 3 TIMES DAILY PRN
Qty: 90 TABLET | Refills: 1 | Status: SHIPPED | OUTPATIENT
Start: 2019-03-28 | End: 2019-05-02

## 2019-03-28 RX ORDER — OXYCODONE HYDROCHLORIDE AND ACETAMINOPHEN 5; 325 MG/1; MG/1
TABLET ORAL
Qty: 18 TABLET | Refills: 0 | Status: SHIPPED | OUTPATIENT
Start: 2019-03-28 | End: 2019-05-02 | Stop reason: SDUPTHER

## 2019-03-28 NOTE — PROGRESS NOTES
CHIEF COMPLAINT  F/U back pain- patient states her pain has worsened since her last visit. She states she is having muscle spasms that make it difficult to function. She is supposed to start PT next week.     Subjective   Jocelyn Grimes is a 68 y.o. female  who presents to the office for follow-up.She has a history of chronic back pain.    Worsening muscle spasms -- TPI with Dr. Summers 1/10/19 -- no benefit     Anterior and posterior lumbar fusion with Dr. Gomez 4/25/2018 (fusion from L2-S1).    Worsening spasms of the low back. Dr. Gomez has recommended PT.  Hardware looks good.      She has tried Flexeril and Tizanidine without much benefit.  Tizanidine makes her drowsy. Reports that she took a Percocet this morning, this was the last left from #20 prescribed over 3 months ago.  Has tried Lidoderm patches without benefit.  Reports that NSAID's do not help much, takes Meloxicam.      Back Pain   This is a chronic problem. The current episode started more than 1 year ago. The problem occurs daily. The problem has been gradually improving since onset. The pain is present in the lumbar spine. The quality of the pain is described as aching and burning. The pain does not radiate. The pain is at a severity of 5/10. The symptoms are aggravated by lying down and sitting. Associated symptoms include numbness (in back). Pertinent negatives include no abdominal pain, bladder incontinence, bowel incontinence, fever, headaches or weakness. She has tried NSAIDs and analgesics for the symptoms. The treatment provided moderate relief.      Past pain medications:   Gabapentin - no help  Medrol dose pack - no help  Aleve prn - some help  Tramadol 50 mg qid prn - minimal help  norco 10/325 mg - helped  norco 7.5/325 mg - 1-2 tablets a day      Current pain medications:   Percocet 7.5/325 mg helping  mobic 15 mg daily - minimal help      Past therapies:  Physical Therapy: no - not for LLE pain - for back pain years  "ago  Chiropractor: no  Massage Therapy: no  TENS: no  Neck or back surgery: yes - lumbar fusion 9/2015 and 4/25/2018 by Dr. Gomez  Past pain management: yes - saw Dr. Paulino prior to surgery 2015.      Previous Injection: left L5, S1 TFESI - 1/3/2018  Effect of Injection (%): 30-40%  Length of Relief: several weeks    PEG Assessment   What number best describes your pain on average in the past week?7  What number best describes how, during the past week, pain has interfered with your enjoyment of life?8  What number best describes how, during the past week, pain has interfered with your general activity?  8    The following portions of the patient's history were reviewed and updated as appropriate: allergies, current medications, past family history, past medical history, past social history, past surgical history and problem list.    Review of Systems   Constitutional: Positive for activity change (decreased) and fatigue. Negative for chills and fever.   HENT: Positive for hearing loss. Negative for congestion.    Eyes: Negative for visual disturbance.   Respiratory: Negative.    Cardiovascular: Negative.    Gastrointestinal: Negative for abdominal pain, bowel incontinence, constipation, diarrhea and nausea.   Genitourinary: Negative for bladder incontinence and difficulty urinating.   Musculoskeletal: Positive for back pain, joint swelling (bilateral ankles) and myalgias.   Neurological: Positive for numbness (in back). Negative for dizziness, weakness, light-headedness and headaches.   Psychiatric/Behavioral: Positive for agitation. Negative for sleep disturbance and suicidal ideas. The patient is nervous/anxious.    All other systems reviewed and are negative.    Vitals:    03/28/19 1303   BP: 141/93   Pulse: 85   Resp: 16   Temp: 98.3 °F (36.8 °C)   SpO2: 94%   Weight: 88.8 kg (195 lb 12.8 oz)   Height: 160 cm (63\")   PainSc:   5   PainLoc: Back     Objective   Physical Exam   Constitutional: She is " oriented to person, place, and time. She appears well-developed and well-nourished.   HENT:   Head: Normocephalic and atraumatic.   Eyes: Conjunctivae and EOM are normal.   Neck: Neck supple.   Cardiovascular: Normal rate.   Pulmonary/Chest: Effort normal. No respiratory distress.   Musculoskeletal:        Lumbar back: She exhibits tenderness, pain and spasm.   Neurological: She is alert and oriented to person, place, and time. No sensory deficit. Coordination and gait normal.   Skin: Skin is warm and dry.   Psychiatric: She has a normal mood and affect. Her behavior is normal.   Nursing note and vitals reviewed.      Assessment/Plan   Jocelyn was seen today for back pain.    Diagnoses and all orders for this visit:    Other chronic pain    Chronic bilateral low back pain without sciatica    Encounter for monitoring opioid maintenance therapy    Other orders  -     chlorzoxazone (PARAFON FORTE) 500 MG tablet; Take 1 tablet by mouth 3 (Three) Times a Day As Needed for Muscle Spasms.  -     oxyCODONE-acetaminophen (PERCOCET) 5-325 MG per tablet; Take 1 tablet po q 4 hours prn severe pain      --- Agree with PT - this is most likely myofascial - can consider some dry needling  --- Refill small quantity of Percocet.  Intended to last several months as the previous prescription did.   --- Trial a different muscle relaxant will help that she will tolerate this better during the day.  Chlorzoxazone sent to pharmacy.  --- The urine drug screen confirmation from 7/10/18 has been reviewed and the result is appropriate based on patient history and TRENT report  --- Follow-up 2 months/sooner if needed          TRENT REPORT    As part of the patient's treatment plan, I am prescribing controlled substances. The patient has been made aware of appropriate use of such medications, including potential risk of somnolence, limited ability to drive and/or work safely, and the potential for dependence or overdose. It has also bee made  clear that these medications are for use by this patient only, without concomitant use of alcohol or other substances unless prescribed.     Patient has completed prescribing agreement detailing terms of continued prescribing of controlled substances, including monitoring TRENT reports, urine drug screening, and pill counts if necessary. The patient is aware that inappropriate use will results in cessation of prescribing such medications.    TRENT report has been reviewed and scanned into the patient's chart.    As the clinician, I personally reviewed the TRENT from 3/26/19 while the patient was in the office today.    History and physical exam exhibit continued safe and appropriate use of controlled substances.    EMR Dragon/Transcription disclaimer:   Much of this encounter note is an electronic transcription/translation of spoken language to printed text. The electronic translation of spoken language may permit erroneous, or at times, nonsensical words or phrases to be inadvertently transcribed; Although I have reviewed the note for such errors, some may still exist.

## 2019-05-02 ENCOUNTER — OFFICE VISIT (OUTPATIENT)
Dept: PAIN MEDICINE | Facility: CLINIC | Age: 69
End: 2019-05-02

## 2019-05-02 VITALS
OXYGEN SATURATION: 94 % | DIASTOLIC BLOOD PRESSURE: 96 MMHG | TEMPERATURE: 98.8 F | WEIGHT: 192.9 LBS | HEIGHT: 63 IN | SYSTOLIC BLOOD PRESSURE: 138 MMHG | BODY MASS INDEX: 34.18 KG/M2 | RESPIRATION RATE: 18 BRPM | HEART RATE: 90 BPM

## 2019-05-02 DIAGNOSIS — G89.29 CHRONIC RIGHT-SIDED LOW BACK PAIN WITHOUT SCIATICA: Primary | ICD-10-CM

## 2019-05-02 DIAGNOSIS — M43.06 LUMBAR SPONDYLOLYSIS: ICD-10-CM

## 2019-05-02 DIAGNOSIS — Z51.81 ENCOUNTER FOR MONITORING OPIOID MAINTENANCE THERAPY: ICD-10-CM

## 2019-05-02 DIAGNOSIS — M54.50 CHRONIC RIGHT-SIDED LOW BACK PAIN WITHOUT SCIATICA: Primary | ICD-10-CM

## 2019-05-02 DIAGNOSIS — Z79.891 ENCOUNTER FOR MONITORING OPIOID MAINTENANCE THERAPY: ICD-10-CM

## 2019-05-02 LAB
POC AMPHETAMINES: NEGATIVE
POC BARBITURATES: NEGATIVE
POC BENZODIAZEPHINES: POSITIVE
POC COCAINE: NEGATIVE
POC METHADONE: NEGATIVE
POC METHAMPHETAMINE SCREEN URINE: NEGATIVE
POC OPIATES: NEGATIVE
POC OXYCODONE: NEGATIVE
POC PHENCYCLIDINE: NEGATIVE
POC PROPOXYPHENE: NEGATIVE
POC THC: NEGATIVE
POC TRICYCLIC ANTIDEPRESSANTS: POSITIVE

## 2019-05-02 PROCEDURE — 99213 OFFICE O/P EST LOW 20 MIN: CPT | Performed by: PAIN MEDICINE

## 2019-05-02 PROCEDURE — 80305 DRUG TEST PRSMV DIR OPT OBS: CPT | Performed by: PAIN MEDICINE

## 2019-05-02 RX ORDER — CHLORZOXAZONE 750 MG/1
TABLET ORAL
Qty: 60 TABLET | Refills: 5 | Status: SHIPPED | OUTPATIENT
Start: 2019-05-02

## 2019-05-02 RX ORDER — OXYCODONE HYDROCHLORIDE AND ACETAMINOPHEN 5; 325 MG/1; MG/1
1 TABLET ORAL EVERY 6 HOURS PRN
Qty: 45 TABLET | Refills: 0 | Status: SHIPPED | OUTPATIENT
Start: 2019-05-02

## 2019-05-02 RX ORDER — METHOCARBAMOL 500 MG/1
500 TABLET, FILM COATED ORAL 2 TIMES DAILY PRN
Qty: 60 TABLET | Refills: 5 | Status: SHIPPED | OUTPATIENT
Start: 2019-05-02

## 2019-05-02 NOTE — PROGRESS NOTES
CHIEF COMPLAINT: Back Pain    HPI  Jocelyn Grimes is a 69 y.o. female.  She is here to follow up for Back Pain    Jocelyn Grimes is a 69 y.o. female  who presents to the office for follow-up. Since last visit their pain has worsened. Continues to have low back muscle spasms and tightness. Has went to PT biweekly for 4 weeks without improvement, thinks PT may be making her worse. Spasms occur daily and last for 1-2 hours.   Trying to walk and get exercise but has increased pain and PETERS.   Has went to Dr. Gomez for evaluation who stated hardware looked good.   Taking more percocet over last month due to increased pain. Received #18 5 weeks ago and has 1 left. Prior 3 months had #20.   TPI performed 1/19 without relief.   The patient states their pain is a 5 on a scale of 1-10.  The patient describes this pain as episodic cramping.  The pain is located in right low back and does not radiate. This painful problem is aggravated by physical activity, movement and exercise and is alleviated by relaxation and lying down.  tizanidine helps but can only tolerate at night due to SE.   Flexeril - didn't notice a differnce  Parafon forte - could not fill  mobic - taking regularly - no benefit  Diclofenac patch - no help    Past pain medications:   Gabapentin - no help  Medrol dose pack - no help  Aleve prn - some help  Tramadol 50 mg qid prn - minimal help  norco 10/325 mg - helped  norco 7.5/325 mg - 1-2 tablets a day  Tizanidine - makes sleepy  Parafon forte- could not fill  mobic - taking regularly - no benefit  Diclofenac patch - no help      Current pain medications:   Percocet 7.5/325 mg helping  mobic 15 mg daily - minimal help      Past therapies:  Physical Therapy: no - not for LLE pain - for back pain years ago  Chiropractor: no  Massage Therapy: no  TENS: no  Neck or back surgery: yes - lumbar fusion 9/2015 and 4/25/2018 by Dr. Gomez  Past pain management: yes - saw Dr. Paulino prior to surgery 2015.       Previous Injection: left L5, S1 TFESI - 1/3/2018  Effect of Injection (%): 30-40%  Length of Relief: several weeks    PEG Assessment   What number best describes your pain on average in the past week? 7  What number best describes how, during the past week, pain has interfered with your enjoyment of life? 7  What number best describes how, during the past week, pain has interfered with your general activity? 8      Current Outpatient Medications:   •  amLODIPine-benazepril (LOTREL 5-10) 5-10 MG per capsule, Daily., Disp: , Rfl:   •  desvenlafaxine (PRISTIQ) 100 MG 24 hr tablet, Daily., Disp: , Rfl:   •  diclofenac (FLECTOR) 1.3 % patch patch, Apply 1 patch topically to the appropriate area as directed 2 (Two) Times a Day., Disp: 60 patch, Rfl: 3  •  fluticasone (FLONASE) 50 MCG/ACT nasal spray, , Disp: , Rfl: 0  •  gabapentin (NEURONTIN) 300 MG capsule, Take 1 capsule by mouth 3 (Three) Times a Day. (Patient taking differently: Take 300 mg by mouth 3 (Three) Times a Day As Needed.), Disp: 90 capsule, Rfl: 2  •  lidocaine (LIDODERM) 5 %, Place 1 patch on the skin as directed by provider Daily. Remove & Discard patch within 12 hours or as directed by MD, Disp: 1 patch, Rfl: 3  •  meloxicam (MOBIC) 15 MG tablet, Take 15 mg by mouth Daily., Disp: , Rfl: 0  •  omega-3 acid ethyl esters (LOVAZA) 1 g capsule, Take 2 g by mouth., Disp: , Rfl:   •  oxyCODONE-acetaminophen (PERCOCET) 5-325 MG per tablet, Take 1 tablet by mouth Every 6 (Six) Hours As Needed for Severe Pain ., Disp: 45 tablet, Rfl: 0  •  pantoprazole (PROTONIX) 40 MG EC tablet, Take 40 mg by mouth Daily As Needed., Disp: , Rfl:   •  polyethylene glycol (MIRALAX) powder, MIX 1 CAPFUL IN 8 OUNCES OF WATER AND DRINK DAILY AS DIRECTED., Disp: , Rfl: 3  •  traZODone (DESYREL) 50 MG tablet, TAKE 1 TABLET BY MOUTH EVERYDAY AT BEDTIME, Disp: , Rfl: 5  •  methocarbamol (ROBAXIN) 500 MG tablet, Take 1 tablet by mouth 2 (Two) Times a Day As Needed for Muscle Spasms.,  "Disp: 60 tablet, Rfl: 5    IMAGING         Imaging last reviewed: 05/02/19     PFSH:  The following portions of the patient's history were reviewed and updated as appropriate: problem list, past medical history, past surgery history, social history, family history, medications, and allergies    Review of Systems   Constitutional: Positive for fatigue.   HENT: Negative for congestion.    Eyes: Negative for visual disturbance.   Respiratory: Positive for shortness of breath.    Cardiovascular: Negative.    Gastrointestinal: Negative for constipation and diarrhea.   Genitourinary: Negative for difficulty urinating.   Musculoskeletal: Positive for back pain.   Neurological: Positive for numbness. Negative for weakness.   Psychiatric/Behavioral: Positive for sleep disturbance. Negative for suicidal ideas. The patient is not nervous/anxious.    All other systems reviewed and are negative.      Vitals:    05/02/19 1319   BP: 138/96   Pulse: 90   Resp: 18   Temp: 98.8 °F (37.1 °C)   SpO2: 94%   Weight: 87.5 kg (192 lb 14.4 oz)   Height: 160 cm (63\")   PainSc:   5   PainLoc: Back       Physical Exam   Constitutional: She is oriented to person, place, and time. She appears well-developed and well-nourished.   HENT:   Head: Normocephalic and atraumatic.   Eyes: Conjunctivae and EOM are normal.   Neck: Neck supple.   Cardiovascular: Normal rate.   Pulmonary/Chest: Effort normal. No respiratory distress.   Musculoskeletal:        Lumbar back: She exhibits tenderness, pain and spasm.   +right sided facet loading   Neurological: She is alert and oriented to person, place, and time. No sensory deficit. Coordination and gait normal.   Skin: Skin is warm and dry.   Psychiatric: She has a normal mood and affect. Her behavior is normal.   Nursing note and vitals reviewed.    Ortho Exam  Neurologic Exam     Mental Status   Oriented to person, place, and time.     Cranial Nerves     CN III, IV, VI   Extraocular motions are normal. "       Lab Results   Component Value Date    POCMETH Negative 07/09/2018    POCAMPHET Negative 07/09/2018    POCBARBITUR Positive (A) 07/09/2018    POCBENZO Negative 07/09/2018    POCCOCAINE Negative 07/09/2018    POCMETHADO Negative 07/09/2018    POCOPIATES Negative 07/09/2018    POCOXYCODO Positive (A) 07/09/2018    POCPHENCYC Negative 07/09/2018    POCPROPOXY Negative 07/09/2018    POCTHC Negative 07/09/2018    POCTRICYC Positive (A) 07/09/2018     Last UDS results reviewed: 05/02/19   Last UDS: 7/2018  Comments: Consistent     Date of last TRENT reviewed : 05/02/19   Comments: Tyler Banks was seen today for back pain.    Diagnoses and all orders for this visit:    Chronic right-sided low back pain without sciatica    Encounter for monitoring opioid maintenance therapy    Lumbar spondylolysis  -     Case Request    Other orders  -     methocarbamol (ROBAXIN) 500 MG tablet; Take 1 tablet by mouth 2 (Two) Times a Day As Needed for Muscle Spasms.  -     oxyCODONE-acetaminophen (PERCOCET) 5-325 MG per tablet; Take 1 tablet by mouth Every 6 (Six) Hours As Needed for Severe Pain .      Requested Prescriptions     Signed Prescriptions Disp Refills   • methocarbamol (ROBAXIN) 500 MG tablet 60 tablet 5     Sig: Take 1 tablet by mouth 2 (Two) Times a Day As Needed for Muscle Spasms.   • oxyCODONE-acetaminophen (PERCOCET) 5-325 MG per tablet 45 tablet 0     Sig: Take 1 tablet by mouth Every 6 (Six) Hours As Needed for Severe Pain .     --- Continue with PT - this is most likely myofascial.   - no benefit with TPI - will try right sided facet injections to see if she can get any relief. Discussed with the patient regarding the etiology of their pain. Informed them that they would likely benefit from a right medial branch block from L2-L5.  The procedure was described in detail and the risks, benefits and alternatives were discussed with the patient (including but not limited to: bleeding, infection, nerve  damage, worsening of pain, inability to perform injection, paralysis, seizures, and death) who agreed to proceed.     - Refill small quantity of Percocet.  Intended to last several months as the previous prescription did. Not best medication for pain but only medication that has been tolerable and effective. Plans not to be on long term.   - Trial a different muscle relaxant will help that she will tolerate this better during the day.  Robaxin sent to pharmacy. Will also send lorzone as it was denied last office visit but will send to specialty pharmacy.   - The urine drug screen confirmation from 7/10/18 has been reviewed and the result is appropriate based on patient history and TRENT report. Random urine drug screen per office policy today, to be checked at next visit.       Wt Readings from Last 3 Encounters:   05/02/19 87.5 kg (192 lb 14.4 oz)   03/28/19 88.8 kg (195 lb 12.8 oz)   01/10/19 86.8 kg (191 lb 6 oz)     Body mass index is 34.17 kg/m². Patient counseled on the importance of weight loss to help with overall health and pain control. Patient instructed to attempt weight loss.   Plan: Calorie counting  increase water intake and increase physical activity    Follow-up after injection    Juana Summers MD  Pain Management     TRENT REPORT  As part of the patient's treatment plan, I am prescribing controlled substances. The patient has been made aware of appropriate use of such medications, including potential risk of somnolence, limited ability to drive and/or work safely, and the potential for dependence or overdose. It has also bee made clear that these medications are for use by this patient only, without concomitant use of alcohol or other substances unless prescribed.   Patient has completed prescribing agreement detailing terms of continued prescribing of controlled substances, including monitoring TRENT reports, urine drug screening, and pill counts if necessary. The patient is aware that  inappropriate use will results in cessation of prescribing such medications.  TRENT report has been reviewed and scanned into the patient's chart.  History and physical exam exhibit continued safe and appropriate use of controlled substances.

## 2019-05-06 ENCOUNTER — OUTSIDE FACILITY SERVICE (OUTPATIENT)
Dept: PAIN MEDICINE | Facility: CLINIC | Age: 69
End: 2019-05-06

## 2019-05-06 ENCOUNTER — DOCUMENTATION (OUTPATIENT)
Dept: PAIN MEDICINE | Facility: CLINIC | Age: 69
End: 2019-05-06

## 2019-05-06 PROCEDURE — 64493 INJ PARAVERT F JNT L/S 1 LEV: CPT | Performed by: PAIN MEDICINE

## 2019-05-06 PROCEDURE — 64495 INJ PARAVERT F JNT L/S 3 LEV: CPT | Performed by: PAIN MEDICINE

## 2019-05-06 PROCEDURE — 64494 INJ PARAVERT F JNT L/S 2 LEV: CPT | Performed by: PAIN MEDICINE

## 2019-05-06 NOTE — PROGRESS NOTES
RIGHT L2-5 Lumbar Medial Branch Blockade  [facility]    PREOPERATIVE DIAGNOSIS:  Lumbar spondylosis without myelopathy    POSTOPERATIVE DIAGNOSIS:  Lumbar spondylosis without myelopathy    PROCEDURE:   Diagnostic Right Lumbar Medial Branch Nerve Blockades, with fluoroscopy:  L2, L3, L4, and L5 nerves (at the L3, L4, L5 transverse processes and the sacral alar groove) to block facet joints L3-4, L4-5, and L5-S1  1. 50888 -- Lumbar Facet block, 1st Level  2. 33583 -- Lumbar Facet block, 2nd  Level  3. 47462 -- Lumbar Facet block, 3rd Level    PRE-PROCEDURE DISCUSSION WITH PATIENT:    Risks and complications were discussed with the patient prior to starting the procedure and informed consent was obtained.      SURGEON:   Juana Summers MD    REASON FOR PROCEDURE:    The patient complains of pain that seems to have a significant axial component, Tenderness of the affected facet joints on palpation, Painful area identified on exam under fluoroscopy, Increased back pain on range of motion exams and Pain on extension of the lumbar spine    SEDATION:  Versed 4mg & Fentanyl 50 mcg IV  ANESTHETIC:  Marcaine 0.25%  STEROID:  Methylprednisolone (DEPO MEDROL) 40mg/ml  TOTAL VOLUME OF SOLUTION:  4 mL    DESCRIPTON OF PROCEDURE:  After obtaining informed consent, IV access was obtained in the preoperative area.   The patient was taken to the operating room.  The patient was placed in the prone position with a pillow under the abdomen. All pressure points were well padded.  EKG, blood pressure, and pulse oximeter were monitored.  The patient was monitored and sedated by the RN under my direction. The lumbosacral area was prepped with Chloraprep and draped in a sterile fashion. Under fluoroscopic guidance the transverse processes of the L3, L4, and L5 vertebrae at the junctions of the superior articular processes were identified on the affected side.  Also identified was the groove between the ala and the  superior articular process of the sacrum on the ipsilateral side.  Skin and subcutaneous tissue were anesthetized with 1% lidocaine above each of these points. A spinal needle was introduced under fluoroscopic guidance at the above junctions. Aspiration was negative for blood and CSF.  After confirming the position of the needle with fluoroscope in all views, 1 mL of the anesthetic solution noted above was injected at each of these points.  Needles were removed intact from each of the areas.   Onset of analgesia was noted.  Vital signs remained stable throughout.      ESTIMATED BLOOD LOSS:  <5 mL  SPECIMENS:  none    COMPLICATIONS:   No complications were noted.    TOLERANCE & DISCHARGE CONDITION:    The patient tolerated the procedure well.  The patient was transported to the recovery area without difficulties.  The patient was discharged to home under the care of family in stable and satisfactory condition.    PLAN OF CARE:  1. The patient was given our standard instruction sheet.  2. We discussed that Lumbar Medial Branch Blockade is a diagnostic procedure in consideration for radiofrequency ablation if two diagnostic procedures prove to be positive for significant benefit.  If sustained relief of 6 to eight weeks is obtained, then an alternative plan could be therapeutic lumbar branch blockades.  3. The patient is asked to keep a pain log each hour for 8 hours after the procedure today.  4. The patient will  Return to clinic 4-6 wks  5. The patient will resume all medications as per the medication reconciliation sheet.

## 2019-05-07 ENCOUNTER — RESULTS ENCOUNTER (OUTPATIENT)
Dept: PAIN MEDICINE | Facility: CLINIC | Age: 69
End: 2019-05-07

## 2019-05-07 DIAGNOSIS — M43.06 LUMBAR SPONDYLOLYSIS: ICD-10-CM

## 2019-05-07 DIAGNOSIS — M54.50 CHRONIC RIGHT-SIDED LOW BACK PAIN WITHOUT SCIATICA: ICD-10-CM

## 2019-05-07 DIAGNOSIS — Z79.891 ENCOUNTER FOR MONITORING OPIOID MAINTENANCE THERAPY: ICD-10-CM

## 2019-05-07 DIAGNOSIS — G89.29 CHRONIC RIGHT-SIDED LOW BACK PAIN WITHOUT SCIATICA: ICD-10-CM

## 2019-05-07 DIAGNOSIS — Z51.81 ENCOUNTER FOR MONITORING OPIOID MAINTENANCE THERAPY: ICD-10-CM

## 2019-05-26 VITALS
DIASTOLIC BLOOD PRESSURE: 74 MMHG | WEIGHT: 194 LBS | HEART RATE: 68 BPM | HEIGHT: 63 IN | SYSTOLIC BLOOD PRESSURE: 122 MMHG

## 2019-05-26 PROBLEM — D12.4 BENIGN NEOPLASM OF DESCENDING COLON: Status: ACTIVE | Noted: 2019-03-25

## 2019-05-29 ENCOUNTER — OFFICE (OUTPATIENT)
Dept: URBAN - METROPOLITAN AREA CLINIC 75 | Facility: CLINIC | Age: 69
End: 2019-05-29
Payer: COMMERCIAL

## 2019-05-29 DIAGNOSIS — D12.4 BENIGN NEOPLASM OF DESCENDING COLON: ICD-10-CM

## 2019-05-29 DIAGNOSIS — K57.92 DIVERTICULITIS OF INTESTINE, PART UNSPECIFIED, WITHOUT PERFO: ICD-10-CM

## 2019-05-29 DIAGNOSIS — K57.90 DIVERTICULOSIS OF INTESTINE, PART UNSPECIFIED, WITHOUT PERFO: ICD-10-CM

## 2019-05-29 PROCEDURE — 99213 OFFICE O/P EST LOW 20 MIN: CPT

## 2019-05-29 RX ORDER — ONDANSETRON 4 MG/1
12 TABLET, ORALLY DISINTEGRATING ORAL
Qty: 45 | Refills: 4 | Status: COMPLETED
Start: 2019-05-29 | End: 2020-09-21

## 2019-12-23 RX ORDER — CHLORZOXAZONE 500 MG/1
500 TABLET ORAL 3 TIMES DAILY PRN
Qty: 90 TABLET | Refills: 1 | OUTPATIENT
Start: 2019-12-23

## 2020-07-19 VITALS — HEIGHT: 63 IN

## 2020-07-21 ENCOUNTER — OFFICE (OUTPATIENT)
Dept: URBAN - METROPOLITAN AREA CLINIC 75 | Facility: CLINIC | Age: 70
End: 2020-07-21
Payer: MEDICARE

## 2020-07-21 DIAGNOSIS — K57.92 DIVERTICULITIS OF INTESTINE, PART UNSPECIFIED, WITHOUT PERFO: ICD-10-CM

## 2020-07-21 DIAGNOSIS — K57.90 DIVERTICULOSIS OF INTESTINE, PART UNSPECIFIED, WITHOUT PERFO: ICD-10-CM

## 2020-07-21 DIAGNOSIS — R63.4 ABNORMAL WEIGHT LOSS: ICD-10-CM

## 2020-07-21 DIAGNOSIS — Z86.010 PERSONAL HISTORY OF COLONIC POLYPS: ICD-10-CM

## 2020-07-21 DIAGNOSIS — K30 FUNCTIONAL DYSPEPSIA: ICD-10-CM

## 2020-07-21 PROCEDURE — 99212 OFFICE O/P EST SF 10 MIN: CPT | Mod: 95

## 2020-09-16 VITALS
DIASTOLIC BLOOD PRESSURE: 773 MMHG | SYSTOLIC BLOOD PRESSURE: 136 MMHG | HEART RATE: 66 BPM | HEART RATE: 71 BPM | TEMPERATURE: 97.5 F | OXYGEN SATURATION: 95 % | HEART RATE: 64 BPM | SYSTOLIC BLOOD PRESSURE: 114 MMHG | HEART RATE: 62 BPM | RESPIRATION RATE: 8 BRPM | OXYGEN SATURATION: 94 % | RESPIRATION RATE: 20 BRPM | DIASTOLIC BLOOD PRESSURE: 71 MMHG | HEIGHT: 63 IN | RESPIRATION RATE: 18 BRPM | DIASTOLIC BLOOD PRESSURE: 85 MMHG | OXYGEN SATURATION: 92 % | RESPIRATION RATE: 12 BRPM | DIASTOLIC BLOOD PRESSURE: 78 MMHG | SYSTOLIC BLOOD PRESSURE: 105 MMHG | RESPIRATION RATE: 9 BRPM | WEIGHT: 173 LBS | SYSTOLIC BLOOD PRESSURE: 119 MMHG | DIASTOLIC BLOOD PRESSURE: 84 MMHG | OXYGEN SATURATION: 91 % | OXYGEN SATURATION: 87 % | SYSTOLIC BLOOD PRESSURE: 132 MMHG | TEMPERATURE: 98.1 F | OXYGEN SATURATION: 93 % | RESPIRATION RATE: 16 BRPM

## 2020-09-18 ENCOUNTER — OFFICE (OUTPATIENT)
Dept: URBAN - METROPOLITAN AREA LAB 2 | Facility: LAB | Age: 70
End: 2020-09-18
Payer: MEDICARE

## 2020-09-18 DIAGNOSIS — Z20.828 CONTACT WITH AND (SUSPECTED) EXPOSURE TO OTHER VIRAL COMMUNI: ICD-10-CM

## 2020-09-18 PROCEDURE — U0002 COVID-19 LAB TEST NON-CDC: HCPCS

## 2020-09-18 PROCEDURE — 87633 RESP VIRUS 12-25 TARGETS: CPT

## 2020-09-21 ENCOUNTER — OFFICE (OUTPATIENT)
Dept: URBAN - METROPOLITAN AREA PATHOLOGY 4 | Facility: PATHOLOGY | Age: 70
End: 2020-09-21
Payer: MEDICARE

## 2020-09-21 ENCOUNTER — AMBULATORY SURGICAL CENTER (OUTPATIENT)
Dept: URBAN - METROPOLITAN AREA SURGERY 17 | Facility: SURGERY | Age: 70
End: 2020-09-21
Payer: MEDICARE

## 2020-09-21 DIAGNOSIS — K31.89 OTHER DISEASES OF STOMACH AND DUODENUM: ICD-10-CM

## 2020-09-21 DIAGNOSIS — R13.10 DYSPHAGIA, UNSPECIFIED: ICD-10-CM

## 2020-09-21 DIAGNOSIS — K30 FUNCTIONAL DYSPEPSIA: ICD-10-CM

## 2020-09-21 DIAGNOSIS — K44.9 DIAPHRAGMATIC HERNIA WITHOUT OBSTRUCTION OR GANGRENE: ICD-10-CM

## 2020-09-21 DIAGNOSIS — R63.4 ABNORMAL WEIGHT LOSS: ICD-10-CM

## 2020-09-21 DIAGNOSIS — K22.2 ESOPHAGEAL OBSTRUCTION: ICD-10-CM

## 2020-09-21 LAB
GI HISTOLOGY: A. UNSPECIFIED: (no result)
GI HISTOLOGY: PDF REPORT: (no result)

## 2020-09-21 PROCEDURE — 43239 EGD BIOPSY SINGLE/MULTIPLE: CPT | Mod: 59

## 2020-09-21 PROCEDURE — 88305 TISSUE EXAM BY PATHOLOGIST: CPT

## 2020-09-21 PROCEDURE — 43249 ESOPH EGD DILATION <30 MM: CPT

## 2020-09-23 PROBLEM — R10.13 EPIGASTRIC PAIN: Status: ACTIVE | Noted: 2020-09-21

## 2020-10-07 ENCOUNTER — OFFICE (OUTPATIENT)
Dept: URBAN - METROPOLITAN AREA CLINIC 75 | Facility: CLINIC | Age: 70
End: 2020-10-07
Payer: MEDICARE

## 2020-10-07 VITALS — HEIGHT: 63 IN | WEIGHT: 150 LBS

## 2020-10-07 DIAGNOSIS — K22.2 ESOPHAGEAL OBSTRUCTION: ICD-10-CM

## 2020-10-07 DIAGNOSIS — K30 FUNCTIONAL DYSPEPSIA: ICD-10-CM

## 2020-10-07 PROCEDURE — 99213 OFFICE O/P EST LOW 20 MIN: CPT

## 2020-10-08 RX ORDER — ONDANSETRON 4 MG/1
12 TABLET, ORALLY DISINTEGRATING ORAL
Qty: 30 | Refills: 4 | Status: ACTIVE
Start: 2020-10-08

## 2020-10-25 VITALS — HEIGHT: 63 IN

## 2020-10-25 PROBLEM — K44.9 DIAPHRAGMATIC HERNIA WITHOUT OBSTRUCTION OR GANGRENE: Status: ACTIVE | Noted: 2020-09-21

## 2020-10-27 ENCOUNTER — OFFICE (OUTPATIENT)
Dept: URBAN - METROPOLITAN AREA CLINIC 75 | Facility: CLINIC | Age: 70
End: 2020-10-27
Payer: MEDICARE

## 2020-10-27 DIAGNOSIS — K57.90 DIVERTICULOSIS OF INTESTINE, PART UNSPECIFIED, WITHOUT PERFO: ICD-10-CM

## 2020-10-27 DIAGNOSIS — K44.9 DIAPHRAGMATIC HERNIA WITHOUT OBSTRUCTION OR GANGRENE: ICD-10-CM

## 2020-10-27 PROCEDURE — 99212 OFFICE O/P EST SF 10 MIN: CPT | Mod: 95

## 2020-11-11 ENCOUNTER — OFFICE (OUTPATIENT)
Dept: URBAN - METROPOLITAN AREA CLINIC 75 | Facility: CLINIC | Age: 70
End: 2020-11-11
Payer: MEDICARE

## 2020-11-11 VITALS — HEIGHT: 63 IN

## 2020-11-11 DIAGNOSIS — K22.2 ESOPHAGEAL OBSTRUCTION: ICD-10-CM

## 2020-11-11 DIAGNOSIS — K30 FUNCTIONAL DYSPEPSIA: ICD-10-CM

## 2020-11-11 PROCEDURE — 99212 OFFICE O/P EST SF 10 MIN: CPT | Mod: 95

## 2020-11-11 RX ORDER — ONDANSETRON 4 MG/1
12 TABLET, ORALLY DISINTEGRATING ORAL
Qty: 30 | Refills: 4 | Status: ACTIVE
Start: 2020-10-08

## 2021-01-13 ENCOUNTER — TRANSCRIBE ORDERS (OUTPATIENT)
Dept: ADMINISTRATIVE | Facility: HOSPITAL | Age: 71
End: 2021-01-13

## 2021-01-13 DIAGNOSIS — M17.11 PRIMARY OSTEOARTHRITIS OF RIGHT KNEE: Primary | ICD-10-CM

## 2021-01-27 ENCOUNTER — HOSPITAL ENCOUNTER (OUTPATIENT)
Dept: MRI IMAGING | Facility: HOSPITAL | Age: 71
Discharge: HOME | End: 2021-01-27
Admitting: ORTHOPAEDIC SURGERY

## 2021-01-27 DIAGNOSIS — M17.11 PRIMARY OSTEOARTHRITIS OF RIGHT KNEE: ICD-10-CM

## 2021-01-27 PROCEDURE — 73721 MRI JNT OF LWR EXTRE W/O DYE: CPT

## 2023-03-31 ENCOUNTER — ANESTHESIA EVENT (OUTPATIENT)
Dept: PERIOP | Facility: HOSPITAL | Age: 73
End: 2023-03-31
Payer: MEDICARE

## 2023-04-18 ENCOUNTER — PRE-ADMISSION TESTING (OUTPATIENT)
Dept: PREADMISSION TESTING | Facility: HOSPITAL | Age: 73
End: 2023-04-18
Payer: MEDICARE

## 2023-04-18 VITALS
HEIGHT: 63 IN | HEART RATE: 96 BPM | OXYGEN SATURATION: 94 % | WEIGHT: 169.75 LBS | DIASTOLIC BLOOD PRESSURE: 70 MMHG | BODY MASS INDEX: 30.08 KG/M2 | RESPIRATION RATE: 16 BRPM | SYSTOLIC BLOOD PRESSURE: 102 MMHG

## 2023-04-18 LAB
ANION GAP SERPL CALCULATED.3IONS-SCNC: 6 MMOL/L (ref 5–15)
BUN SERPL-MCNC: 12 MG/DL (ref 8–23)
BUN/CREAT SERPL: 16 (ref 7–25)
CALCIUM SPEC-SCNC: 9.3 MG/DL (ref 8.6–10.5)
CHLORIDE SERPL-SCNC: 98 MMOL/L (ref 98–107)
CO2 SERPL-SCNC: 32 MMOL/L (ref 22–29)
CREAT SERPL-MCNC: 0.75 MG/DL (ref 0.57–1)
DEPRECATED RDW RBC AUTO: 48.5 FL (ref 37–54)
EGFRCR SERPLBLD CKD-EPI 2021: 83.7 ML/MIN/1.73
ERYTHROCYTE [DISTWIDTH] IN BLOOD BY AUTOMATED COUNT: 12.6 % (ref 12.3–15.4)
GLUCOSE SERPL-MCNC: 118 MG/DL (ref 65–99)
HBA1C MFR BLD: 5.7 % (ref 4.8–5.6)
HCT VFR BLD AUTO: 39.1 % (ref 34–46.6)
HGB BLD-MCNC: 12.7 G/DL (ref 12–15.9)
MCH RBC QN AUTO: 34 PG (ref 26.6–33)
MCHC RBC AUTO-ENTMCNC: 32.5 G/DL (ref 31.5–35.7)
MCV RBC AUTO: 104.5 FL (ref 79–97)
PLATELET # BLD AUTO: 219 10*3/MM3 (ref 140–450)
PMV BLD AUTO: 9.5 FL (ref 6–12)
POTASSIUM SERPL-SCNC: 3.6 MMOL/L (ref 3.5–5.2)
QT INTERVAL: 393 MS
RBC # BLD AUTO: 3.74 10*6/MM3 (ref 3.77–5.28)
SODIUM SERPL-SCNC: 136 MMOL/L (ref 136–145)
WBC NRBC COR # BLD: 8.02 10*3/MM3 (ref 3.4–10.8)

## 2023-04-18 PROCEDURE — 80048 BASIC METABOLIC PNL TOTAL CA: CPT | Performed by: ORTHOPAEDIC SURGERY

## 2023-04-18 PROCEDURE — 83036 HEMOGLOBIN GLYCOSYLATED A1C: CPT

## 2023-04-18 PROCEDURE — 36415 COLL VENOUS BLD VENIPUNCTURE: CPT

## 2023-04-18 PROCEDURE — 85027 COMPLETE CBC AUTOMATED: CPT | Performed by: ORTHOPAEDIC SURGERY

## 2023-04-18 PROCEDURE — 93005 ELECTROCARDIOGRAM TRACING: CPT

## 2023-04-18 RX ORDER — OXYCODONE AND ACETAMINOPHEN 7.5; 325 MG/1; MG/1
1 TABLET ORAL 3 TIMES DAILY PRN
COMMUNITY
Start: 2023-03-30

## 2023-04-18 RX ORDER — ONDANSETRON 4 MG/1
4 TABLET, FILM COATED ORAL EVERY 8 HOURS PRN
COMMUNITY
Start: 2022-11-29

## 2023-04-18 RX ORDER — TIZANIDINE 4 MG/1
4 TABLET ORAL EVERY 8 HOURS PRN
COMMUNITY

## 2023-04-18 RX ORDER — ACYCLOVIR 800 MG/1
800 TABLET ORAL TAKE AS DIRECTED
COMMUNITY

## 2023-04-18 RX ORDER — ESTRADIOL 0.1 MG/G
1 CREAM VAGINAL WEEKLY
COMMUNITY

## 2023-04-18 RX ORDER — APIXABAN 5 MG/1
1 TABLET, FILM COATED ORAL EVERY 12 HOURS SCHEDULED
COMMUNITY
Start: 2023-03-28

## 2023-04-18 RX ORDER — GABAPENTIN 100 MG/1
100 CAPSULE ORAL 2 TIMES DAILY
COMMUNITY

## 2023-04-18 RX ORDER — BENAZEPRIL HYDROCHLORIDE 10 MG/1
10 TABLET ORAL DAILY
COMMUNITY
Start: 2023-01-26

## 2023-04-18 NOTE — PAT
Pt here for PAT visit.  Pre-op tests completed, chg soap given, and instructions reviewed.  Instructed clears until 2 hrs prior to arrival time, voiced understanding. ERAS handouts given and reviewed, voiced understanding. Surgeon's office checking with PCP for clearance and instructions on holding eliquis.

## 2023-04-18 NOTE — DISCHARGE INSTRUCTIONS
PRE-ADMISSION TESTING INSTRUCTIONS FOR TOTAL JOINT PATIENTS    Take these medications the morning of surgery with a small sip of water:  pristiq and gabapentin, pain pill okay if needed      No aspirin, advil, aleve, ibuprofen, naproxen for 3 days prior to surgery; no diet pills, decongestants, or vitamin/herbal supplements for 2 weeks prior to your surgery.     Tylenol/Acetaminophen ok to take if needed.    Do not take any insulin or diabetes medications the morning of surgery.    Your surgeon may give you Bactroban to use prior to surgery. This will be started 5 days prior to surgery, follow the directions on your prescription from your surgeon for use.      General Instructions:    DO NOT EAT SOLID FOOD AFTER MIDNIGHT THE NIGHT BEFORE SURGERY. No gum, mints, or hard candy after midnight the night before surgery.  You may drink clear liquids the day of surgery up until 2 hours before your arrival time.  Clear liquids are liquids you can see through. Nothing RED in color.    Plain water    Sports drinks      Gelatin (Jell-O)  Fruit juices without pulp such as white grape juice and apple juice  Popsicles that contain no fruit or yogurt  Tea or coffee (no cream or milk added)    It is beneficial for you to have a clear drink that contains carbohydrates 2 hours prior to your arrival time.  DRINK A BOTTLE OF SPORTS DRINK 2 HOURS BEFORE YOUR ARRIVAL TIME. IF YOU ARE DIABETIC, DRINK A LOW OR NO SUGAR SPORTS DRINK. ANY COLOR EXCEPT RED.    Patients who avoid smoking, chewing tobacco and alcohol for 4 weeks prior to surgery have a reduced risk of post-operative complications.  If at all possible, quit smoking as many days before surgery as you can.    Do not smoke, use chewing tobacco or drink alcohol after midnight the day of surgery.    Bring your C-PAP/ BI-PAP machine if you use one.  Wear clean comfortable clothes.  Do not wear contact lenses, lotion, deodorant, or make-up.  Bring a case for your glasses if applicable.    You may brush your teeth the morning of surgery.  You may wear dentures/partials, do not put adhesive/glue on them.  Leave all other jewelry and valuables at home.  NOTIFY YOUR SURGEON IF YOU BECOME ILL, HAVE A FEVER, PRODUCTIVE COUGH, OR CANNOT BE HERE THE DAY OF SURGERY.      Preventing a Surgical Site Infection:    Shower the night before and on the morning of surgery using the chlorhexidine soap you were given.  Use a clean washcloth with the soap.  Place clean sheets on your bed after showering the night before surgery. Do not use the CHG soap on your hair, face, or private areas. Wash your body gently for five (5) minutes. Do not scrub your skin.  Dry with a clean towel and dress in clean clothing.    Do not shave the surgical area for 10 days-2 weeks prior to surgery  because the razor can irritate skin and make it easier to develop an infection.  Make sure you, your family, and all healthcare providers clean their hands with soap and water or an alcohol based hand  before caring for you or your wound.      Day of surgery:    Your surgeon’s office will advise you of your arrival time for the day of surgery.    Upon arrival, a Pre-op nurse and Anesthesia provider will review your health history, obtain vital signs, and answer questions you may have. The anesthesia provider will also discuss the type of anesthesia that will be needed for your procedure, which may include general anesthesia. The only belongings needed at this time will be your home medications and if applicable your C-PAP/BI-PAP machine.  If you are staying overnight your family can leave the rest of your belongings in the car and bring them to your room later.  A Pre-op nurse will start an IV and you may receive medication in preparation for surgery, including something to help you relax.  Your family will be able to see you in the Pre-op area.  While you are in surgery your family should notify the waiting room  if they  leave the waiting room area and provide a contact phone number.    If you have any questions, you can call the Pre-Admission Department at [phone] or your surgeon's office.    Please be aware that surgery does come with discomfort.  We want to make every effort to control your discomfort so please discuss any uncontrolled symptoms with your nurse.   Your doctor will most likely have prescribed pain medications.     You may have bruising or discomfort from the tourniquet used in surgery.     Please leave all luggage in the car the morning of surgery.  You will be transported to your hospital room following the recovery period.  Your family can get your luggage at that time.      You may receive a survey regarding the care you received. Your feedback is very important and will be used to collect the necessary data to help us to continue to provide excellent care.

## 2023-04-25 NOTE — CASE MANAGEMENT/SOCIAL WORK
Continued Stay Note  ADDISON Rutherford     Patient Name: Jocelyn Grimes  MRN: [id number]  Today's Date: 4/25/2023    Admit Date: (Not on file)    Plan: Home with  and [facility]   Discharge Plan     Row Name 04/25/23 1961       Plan    Plan Home with  and [facility]    Patient/Family in Agreement with Plan yes    Plan Comments CM spoke with patient today via phone regarding pre admission discharge planning for her surgery with Dr. Segura on 5/1/2023. Patient states she lives with her  Shon in a two story house with three steps and hand rail to gain entry. She states her bedroom and bath are on the first floor and she normally has no issues entering the home or maneuvering inside. She also states that he  will be able to assist with needs at home and provide ride at discharge. We discussed plans for PT and CM informed her that [facility] at Dr Segura's office has arranged [facility] for PT services and she is agreeable to this service. We then discussed DME and states she has a rolling walker, BSC, elevated commode and straight cane and does not anticipate needing any other equipment at discharge. Patient states she plans on returning home at discharge with her  to help as needed and HH for PT and had no other questions or concerns regarding discharge plans. CM will follow.               Discharge Codes    No documentation.                     Holly Mcconnell RN

## 2023-04-25 NOTE — DISCHARGE PLACEMENT REQUEST
"Jocelyn Kellogg (73 y.o. Female)     Date of Birth   1950    Social Security Number       Address   [address]  [facility] [address]    Home Phone   [phone]    MRN   [id number]       Sikh   Hinduism    Marital Status                               Admission Date       Admission Type   Elective    Admitting Provider   Williams Segura MD    Attending Provider   Williams Segura MD    Department, Room/Bed   [facility], --/--       Discharge Date       Discharge Disposition       Discharge Destination                               Attending Provider: Williams Segura MD    Allergies: Oxycodone, Mercury, Ezetimibe, Adhesive Tape, Hydrocodone    Isolation: None   Infection: None   Code Status: Not on file    Ht: 160 cm (63\")   Wt: 77 kg (169 lb 12.1 oz)    Admission Cmt: None   Principal Problem: None                Active Insurance as of 5/1/2023     Primary Coverage     Payor Plan Insurance Group Employer/Plan Group    MEDICARE MEDICARE A & B      Payor Plan Address Payor Plan Phone Number Payor Plan Fax Number Effective Dates    [address] [phone]  9/1/2010 - None Entered    [facility] [id number]       Subscriber Name Subscriber Birth Date Member ID       JOCELYN KELLOGG 1950 [id number]           Secondary Coverage     Payor Plan Insurance Group Employer/Plan Group    A[employer] COMMERCIAL A[employer] HE[employer] AND LIFE  SUP             Payor Plan Address Payor Plan Phone Number Payor Plan Fax Number Effective Dates    [address] [address]   4/1/2019 - None Entered    [facility] [phone]       Subscriber Name Subscriber Birth Date Member ID       JOCELYN KELLOGG 1950 [id number]                 Emergency Contacts      (Rel.) Home Phone Work Phone Mobile Phone    Mendoza Kellogg (Spouse) [phone] -- --              "

## 2023-05-01 ENCOUNTER — ANESTHESIA (OUTPATIENT)
Dept: PERIOP | Facility: HOSPITAL | Age: 73
End: 2023-05-01
Payer: MEDICARE

## 2023-05-01 ENCOUNTER — APPOINTMENT (OUTPATIENT)
Dept: GENERAL RADIOLOGY | Facility: HOSPITAL | Age: 73
End: 2023-05-01
Payer: MEDICARE

## 2023-05-01 ENCOUNTER — HOSPITAL ENCOUNTER (OUTPATIENT)
Facility: HOSPITAL | Age: 73
Discharge: HOME | End: 2023-05-01
Attending: ORTHOPAEDIC SURGERY | Admitting: ORTHOPAEDIC SURGERY
Payer: MEDICARE

## 2023-05-01 VITALS
HEART RATE: 78 BPM | HEIGHT: 63 IN | BODY MASS INDEX: 29.8 KG/M2 | RESPIRATION RATE: 15 BRPM | DIASTOLIC BLOOD PRESSURE: 71 MMHG | SYSTOLIC BLOOD PRESSURE: 112 MMHG | TEMPERATURE: 97.5 F | WEIGHT: 168.2 LBS | OXYGEN SATURATION: 97 %

## 2023-05-01 DIAGNOSIS — M17.12 ARTHRITIS OF LEFT KNEE: Primary | ICD-10-CM

## 2023-05-01 PROCEDURE — A9270 NON-COVERED ITEM OR SERVICE: HCPCS | Performed by: ORTHOPAEDIC SURGERY

## 2023-05-01 PROCEDURE — 63710000001 ACETAMINOPHEN 500 MG TABLET: Performed by: ORTHOPAEDIC SURGERY

## 2023-05-01 PROCEDURE — 25010000002 KETOROLAC TROMETHAMINE PER 15 MG: Performed by: ORTHOPAEDIC SURGERY

## 2023-05-01 PROCEDURE — 63710000001 GABAPENTIN 300 MG CAPSULE: Performed by: ORTHOPAEDIC SURGERY

## 2023-05-01 PROCEDURE — 63710000001 MELOXICAM 7.5 MG TABLET: Performed by: ORTHOPAEDIC SURGERY

## 2023-05-01 PROCEDURE — 25010000002 ONDANSETRON PER 1 MG: Performed by: NURSE ANESTHETIST, CERTIFIED REGISTERED

## 2023-05-01 PROCEDURE — 25010000002 PROPOFOL 10 MG/ML EMULSION: Performed by: NURSE ANESTHETIST, CERTIFIED REGISTERED

## 2023-05-01 PROCEDURE — 25010000002 DEXAMETHASONE PER 1 MG: Performed by: NURSE ANESTHETIST, CERTIFIED REGISTERED

## 2023-05-01 PROCEDURE — 0 CEFAZOLIN SODIUM-DEXTROSE 2-3 GM-%(50ML) RECONSTITUTED SOLUTION: Performed by: ORTHOPAEDIC SURGERY

## 2023-05-01 PROCEDURE — 25010000002 VANCOMYCIN 1.5 G RECONSTITUTED SOLUTION 1 EACH VIAL: Performed by: ORTHOPAEDIC SURGERY

## 2023-05-01 PROCEDURE — 25010000002 ROPIVACAINE PER 1 MG: Performed by: ORTHOPAEDIC SURGERY

## 2023-05-01 PROCEDURE — 25010000002 MIDAZOLAM PER 1MG: Performed by: NURSE ANESTHETIST, CERTIFIED REGISTERED

## 2023-05-01 PROCEDURE — 63710000001 POVIDONE-IODINE 10 % SOLUTION 118 ML BOTTLE: Performed by: ORTHOPAEDIC SURGERY

## 2023-05-01 PROCEDURE — 25010000002 EPINEPHRINE 1 MG/ML SOLUTION 1 ML VIAL: Performed by: ORTHOPAEDIC SURGERY

## 2023-05-01 PROCEDURE — 25010000002 TRIAMCINOLONE PER 10 MG: Performed by: ORTHOPAEDIC SURGERY

## 2023-05-01 PROCEDURE — 73560 X-RAY EXAM OF KNEE 1 OR 2: CPT

## 2023-05-01 RX ORDER — DEXAMETHASONE SODIUM PHOSPHATE 4 MG/ML
8 INJECTION, SOLUTION INTRA-ARTICULAR; INTRALESIONAL; INTRAMUSCULAR; INTRAVENOUS; SOFT TISSUE ONCE
Status: COMPLETED | OUTPATIENT
Start: 2023-05-01 | End: 2023-05-01

## 2023-05-01 RX ORDER — DEXMEDETOMIDINE HYDROCHLORIDE 100 UG/ML
INJECTION, SOLUTION INTRAVENOUS AS NEEDED
Status: DISCONTINUED | OUTPATIENT
Start: 2023-05-01 | End: 2023-05-01 | Stop reason: SURG

## 2023-05-01 RX ORDER — TRIAMCINOLONE ACETONIDE 40 MG/ML
INJECTION, SUSPENSION INTRA-ARTICULAR; INTRAMUSCULAR AS NEEDED
Status: DISCONTINUED | OUTPATIENT
Start: 2023-05-01 | End: 2023-05-01 | Stop reason: HOSPADM

## 2023-05-01 RX ORDER — GABAPENTIN 300 MG/1
300 CAPSULE ORAL ONCE
Status: COMPLETED | OUTPATIENT
Start: 2023-05-01 | End: 2023-05-01

## 2023-05-01 RX ORDER — ACETAMINOPHEN 500 MG
1000 TABLET ORAL ONCE
Status: COMPLETED | OUTPATIENT
Start: 2023-05-01 | End: 2023-05-01

## 2023-05-01 RX ORDER — FENTANYL CITRATE 50 UG/ML
50 INJECTION, SOLUTION INTRAMUSCULAR; INTRAVENOUS
Status: DISCONTINUED | OUTPATIENT
Start: 2023-05-01 | End: 2023-05-01 | Stop reason: HOSPADM

## 2023-05-01 RX ORDER — PROPOFOL 10 MG/ML
VIAL (ML) INTRAVENOUS CONTINUOUS PRN
Status: DISCONTINUED | OUTPATIENT
Start: 2023-05-01 | End: 2023-05-01 | Stop reason: SURG

## 2023-05-01 RX ORDER — SODIUM CHLORIDE 9 MG/ML
40 INJECTION, SOLUTION INTRAVENOUS AS NEEDED
Status: DISCONTINUED | OUTPATIENT
Start: 2023-05-01 | End: 2023-05-01 | Stop reason: HOSPADM

## 2023-05-01 RX ORDER — KETAMINE HYDROCHLORIDE 10 MG/ML
INJECTION INTRAMUSCULAR; INTRAVENOUS AS NEEDED
Status: DISCONTINUED | OUTPATIENT
Start: 2023-05-01 | End: 2023-05-01 | Stop reason: SURG

## 2023-05-01 RX ORDER — BUPIVACAINE HYDROCHLORIDE 2.5 MG/ML
INJECTION, SOLUTION EPIDURAL; INFILTRATION; INTRACAUDAL
Status: COMPLETED | OUTPATIENT
Start: 2023-05-01 | End: 2023-05-01

## 2023-05-01 RX ORDER — SODIUM CHLORIDE 0.9 % (FLUSH) 0.9 %
10 SYRINGE (ML) INJECTION AS NEEDED
Status: DISCONTINUED | OUTPATIENT
Start: 2023-05-01 | End: 2023-05-01 | Stop reason: HOSPADM

## 2023-05-01 RX ORDER — ONDANSETRON 2 MG/ML
4 INJECTION INTRAMUSCULAR; INTRAVENOUS ONCE AS NEEDED
Status: DISCONTINUED | OUTPATIENT
Start: 2023-05-01 | End: 2023-05-01 | Stop reason: HOSPADM

## 2023-05-01 RX ORDER — TRANEXAMIC ACID 10 MG/ML
INJECTION, SOLUTION INTRAVENOUS AS NEEDED
Status: DISCONTINUED | OUTPATIENT
Start: 2023-05-01 | End: 2023-05-01 | Stop reason: SURG

## 2023-05-01 RX ORDER — OXYCODONE HYDROCHLORIDE AND ACETAMINOPHEN 5; 325 MG/1; MG/1
1 TABLET ORAL ONCE AS NEEDED
Status: DISCONTINUED | OUTPATIENT
Start: 2023-05-01 | End: 2023-05-01 | Stop reason: HOSPADM

## 2023-05-01 RX ORDER — SODIUM CHLORIDE 0.9 % (FLUSH) 0.9 %
10 SYRINGE (ML) INJECTION EVERY 12 HOURS SCHEDULED
Status: DISCONTINUED | OUTPATIENT
Start: 2023-05-01 | End: 2023-05-01 | Stop reason: HOSPADM

## 2023-05-01 RX ORDER — ONDANSETRON 2 MG/ML
4 INJECTION INTRAMUSCULAR; INTRAVENOUS ONCE AS NEEDED
Status: COMPLETED | OUTPATIENT
Start: 2023-05-01 | End: 2023-05-01

## 2023-05-01 RX ORDER — CEFAZOLIN SODIUM 2 G/50ML
2 SOLUTION INTRAVENOUS ONCE
Status: COMPLETED | OUTPATIENT
Start: 2023-05-01 | End: 2023-05-01

## 2023-05-01 RX ORDER — SODIUM CHLORIDE 9 MG/ML
INJECTION, SOLUTION INTRAVENOUS AS NEEDED
Status: DISCONTINUED | OUTPATIENT
Start: 2023-05-01 | End: 2023-05-01 | Stop reason: HOSPADM

## 2023-05-01 RX ORDER — LIDOCAINE HYDROCHLORIDE 10 MG/ML
0.5 INJECTION, SOLUTION EPIDURAL; INFILTRATION; INTRACAUDAL; PERINEURAL ONCE AS NEEDED
Status: DISCONTINUED | OUTPATIENT
Start: 2023-05-01 | End: 2023-05-01 | Stop reason: HOSPADM

## 2023-05-01 RX ORDER — HYDROMORPHONE HYDROCHLORIDE 2 MG/1
2 TABLET ORAL EVERY 4 HOURS PRN
Qty: 40 TABLET | Refills: 0 | Status: SHIPPED | OUTPATIENT
Start: 2023-05-01

## 2023-05-01 RX ORDER — BUPIVACAINE HYDROCHLORIDE 5 MG/ML
INJECTION, SOLUTION PERINEURAL
Status: COMPLETED | OUTPATIENT
Start: 2023-05-01 | End: 2023-05-01

## 2023-05-01 RX ORDER — MELOXICAM 7.5 MG/1
7.5 TABLET ORAL ONCE
Status: COMPLETED | OUTPATIENT
Start: 2023-05-01 | End: 2023-05-01

## 2023-05-01 RX ORDER — MAGNESIUM HYDROXIDE 1200 MG/15ML
LIQUID ORAL AS NEEDED
Status: DISCONTINUED | OUTPATIENT
Start: 2023-05-01 | End: 2023-05-01 | Stop reason: HOSPADM

## 2023-05-01 RX ORDER — MIDAZOLAM HYDROCHLORIDE 2 MG/2ML
0.5 INJECTION, SOLUTION INTRAMUSCULAR; INTRAVENOUS
Status: DISCONTINUED | OUTPATIENT
Start: 2023-05-01 | End: 2023-05-01 | Stop reason: HOSPADM

## 2023-05-01 RX ORDER — FAMOTIDINE 10 MG/ML
20 INJECTION, SOLUTION INTRAVENOUS
Status: COMPLETED | OUTPATIENT
Start: 2023-05-01 | End: 2023-05-01

## 2023-05-01 RX ORDER — SODIUM CHLORIDE, SODIUM LACTATE, POTASSIUM CHLORIDE, CALCIUM CHLORIDE 600; 310; 30; 20 MG/100ML; MG/100ML; MG/100ML; MG/100ML
9 INJECTION, SOLUTION INTRAVENOUS CONTINUOUS
Status: DISCONTINUED | OUTPATIENT
Start: 2023-05-01 | End: 2023-05-01 | Stop reason: HOSPADM

## 2023-05-01 RX ORDER — HYDROXYZINE HYDROCHLORIDE 25 MG/1
25 TABLET, FILM COATED ORAL 3 TIMES DAILY PRN
COMMUNITY

## 2023-05-01 RX ADMIN — BUPIVACAINE HYDROCHLORIDE 15 ML: 2.5 INJECTION, SOLUTION EPIDURAL; INFILTRATION; INTRACAUDAL at 08:59

## 2023-05-01 RX ADMIN — PROPOFOL 55 MCG/KG/MIN: 10 INJECTION, EMULSION INTRAVENOUS at 09:55

## 2023-05-01 RX ADMIN — DEXMEDETOMIDINE 10 MCG: 100 INJECTION, SOLUTION, CONCENTRATE INTRAVENOUS at 08:59

## 2023-05-01 RX ADMIN — VANCOMYCIN HYDROCHLORIDE 1500 MG: 1.5 INJECTION, POWDER, LYOPHILIZED, FOR SOLUTION INTRAVENOUS at 08:05

## 2023-05-01 RX ADMIN — BUPIVACAINE HYDROCHLORIDE 2 ML: 5 INJECTION, SOLUTION PERINEURAL at 09:28

## 2023-05-01 RX ADMIN — DEXMEDETOMIDINE 20 MCG: 100 INJECTION, SOLUTION, CONCENTRATE INTRAVENOUS at 08:55

## 2023-05-01 RX ADMIN — DEXAMETHASONE SODIUM PHOSPHATE 8 MG: 4 INJECTION, SOLUTION INTRAMUSCULAR; INTRAVENOUS at 08:08

## 2023-05-01 RX ADMIN — ONDANSETRON 4 MG: 2 INJECTION INTRAMUSCULAR; INTRAVENOUS at 08:09

## 2023-05-01 RX ADMIN — CEFAZOLIN SODIUM 2 G: 2 SOLUTION INTRAVENOUS at 09:55

## 2023-05-01 RX ADMIN — MELOXICAM 7.5 MG: 7.5 TABLET ORAL at 07:41

## 2023-05-01 RX ADMIN — TRANEXAMIC ACID 1000 MG: 10 INJECTION, SOLUTION INTRAVENOUS at 09:55

## 2023-05-01 RX ADMIN — KETAMINE HYDROCHLORIDE 10 MG: 10 INJECTION INTRAMUSCULAR; INTRAVENOUS at 10:41

## 2023-05-01 RX ADMIN — KETAMINE HYDROCHLORIDE 10 MG: 10 INJECTION INTRAMUSCULAR; INTRAVENOUS at 09:55

## 2023-05-01 RX ADMIN — SODIUM CHLORIDE, POTASSIUM CHLORIDE, SODIUM LACTATE AND CALCIUM CHLORIDE 9 ML/HR: 600; 310; 30; 20 INJECTION, SOLUTION INTRAVENOUS at 08:05

## 2023-05-01 RX ADMIN — MIDAZOLAM HYDROCHLORIDE 0.5 MG: 1 INJECTION, SOLUTION INTRAMUSCULAR; INTRAVENOUS at 08:53

## 2023-05-01 RX ADMIN — ACETAMINOPHEN 1000 MG: 500 TABLET ORAL at 07:41

## 2023-05-01 RX ADMIN — BUPIVACAINE HYDROCHLORIDE 15 ML: 2.5 INJECTION, SOLUTION EPIDURAL; INFILTRATION; INTRACAUDAL; PERINEURAL at 08:55

## 2023-05-01 RX ADMIN — FAMOTIDINE 20 MG: 10 INJECTION, SOLUTION INTRAVENOUS at 08:09

## 2023-05-01 RX ADMIN — GABAPENTIN 300 MG: 300 CAPSULE ORAL at 07:41

## 2023-05-01 NOTE — ANESTHESIA POSTPROCEDURE EVALUATION
Patient: Jocelyn Grimes    Procedure Summary     Date: 05/01/23 Room / Location:  [facility] OR 3 /  [facility]    Anesthesia Start: 0950 Anesthesia Stop: 1138    Procedure: LEFT TOTAL KNEE REPLACEMENT (Left: Knee) Diagnosis: (M17.12)    Surgeons: Williams Segura MD Provider: Manuel Morocho CRNA    Anesthesia Type: MAC, spinal, regional ASA Status: 2          Anesthesia Type: MAC, spinal, regional    Vitals  Vitals Value Taken Time   /49 05/01/23 1300   Temp 97.5 °F (36.4 °C) 05/01/23 1140   Pulse 70 05/01/23 1301   Resp 15 05/01/23 1140   SpO2 98 % 05/01/23 1301   Vitals shown include unvalidated device data.        Post Anesthesia Care and Evaluation    Patient location during evaluation: PHASE II  Patient participation: complete - patient participated  Level of consciousness: awake  Pain management: adequate  Anesthetic complications: No anesthetic complications  PONV Status: none  Cardiovascular status: acceptable  Respiratory status: acceptable  Hydration status: acceptable      
Yes

## 2023-05-01 NOTE — ANESTHESIA PROCEDURE NOTES
Peripheral Block    Pre-sedation assessment completed: 5/1/2023 8:50 AM    Patient reassessed immediately prior to procedure    Patient location during procedure: pre-op  Start time: 5/1/2023 8:59 AM  Stop time: 5/1/2023 9:01 AM  Reason for block: at surgeon's request and post-op pain management  Performed by  CRNA/CAA: Olinda Cervantes, LUANNESRNA: Lucila Stock SRNA  Preanesthetic Checklist  Completed: patient identified, IV checked, site marked, risks and benefits discussed, surgical consent, monitors and equipment checked, pre-op evaluation and timeout performed  Prep:  Pt Position: supine  Sterile barriers:cap, gloves, mask and washed/disinfected hands  Prep: ChloraPrep  Patient monitoring: blood pressure monitoring, continuous pulse oximetry and EKG  Procedure    Sedation: yes  Performed under: local infiltration  Guidance:ultrasound guided    ULTRASOUND INTERPRETATION.  Using ultrasound guidance a 21 G gauge needle was placed in close proximity to the nerve, at which point, under ultrasound guidance anesthetic was injected in the area of the nerve and spread of the anesthesia was seen on ultrasound in close proximity thereto.  There were no abnormalities seen on ultrasound; a digital image was taken; and the patient tolerated the procedure with no complications. Images:still images obtained, printed/placed on chart    Laterality:left  Block Type:iPack  Injection Technique:single-shot  Needle Type:echogenic  Needle Gauge:21 G  Resistance on Injection: none    Medications Used: bupivacaine PF (MARCAINE) injection 0.25% - Injection   15 mL - 5/1/2023 8:59:00 AM      Medications  Comment:Precedex added    Post Assessment  Injection Assessment: negative aspiration for heme, no paresthesia on injection and incremental injection  Patient Tolerance:comfortable throughout block  Complications:no

## 2023-05-01 NOTE — ANESTHESIA PROCEDURE NOTES
Peripheral Block    Pre-sedation assessment completed: 5/1/2023 8:50 AM    Patient reassessed immediately prior to procedure    Patient location during procedure: pre-op  Start time: 5/1/2023 8:55 AM  Stop time: 5/1/2023 8:57 AM  Reason for block: at surgeon's request and post-op pain management  Performed by  CRNA/CAA: Olinda Cervantes, LUANNESRNA: Lucila Stock SRNA  Preanesthetic Checklist  Completed: patient identified, IV checked, site marked, risks and benefits discussed, surgical consent, monitors and equipment checked, pre-op evaluation and timeout performed  Prep:  Pt Position: supine  Sterile barriers:cap, gloves, mask and washed/disinfected hands  Prep: ChloraPrep  Patient monitoring: blood pressure monitoring, continuous pulse oximetry and EKG  Procedure    Sedation: yes  Performed under: local infiltration  Guidance:ultrasound guided    ULTRASOUND INTERPRETATION.  Using ultrasound guidance a 21 G gauge needle was placed in close proximity to the nerve, at which point, under ultrasound guidance anesthetic was injected in the area of the nerve and spread of the anesthesia was seen on ultrasound in close proximity thereto.  There were no abnormalities seen on ultrasound; a digital image was taken; and the patient tolerated the procedure with no complications. Images:still images obtained, printed/placed on chart    Laterality:left  Block Type:adductor canal block  Injection Technique:single-shot  Needle Type:echogenic  Needle Gauge:21 G  Resistance on Injection: none    Medications Used: bupivacaine PF (MARCAINE) injection 0.25% - Injection   15 mL - 5/1/2023 8:55:00 AM      Medications  Comment:Precedex added    Post Assessment  Injection Assessment: negative aspiration for heme, no paresthesia on injection and incremental injection  Patient Tolerance:comfortable throughout block  Complications:no

## 2023-05-01 NOTE — ANESTHESIA PREPROCEDURE EVALUATION
Anesthesia Evaluation     Patient summary reviewed and Nursing notes reviewed   history of anesthetic complications: prolonged sedation  NPO Solid Status: > 8 hours  NPO Liquid Status: > 2 hours           Airway   Mallampati: II  TM distance: >3 FB  Neck ROM: full  No difficulty expected  Dental      Pulmonary     breath sounds clear to auscultation  Cardiovascular   Exercise tolerance: good (4-7 METS)    ECG reviewed  PT is on anticoagulation therapy  Rhythm: regular  Rate: normal    (+) hypertension well controlled less than 2 medications, DVT resolved, hyperlipidemia,     ROS comment: Ascending aorta enlargement Dr Luevano follows, echo 12/2022     Narrative & Impression      HEART RATE= 82  bpm  RR Interval= 728  ms  ME Interval= 165  ms  P Horizontal Axis= 36  deg  P Front Axis= 24  deg  QRSD Interval= 135  ms  QT Interval= 393  ms  QRS Axis= -30  deg  T Wave Axis= 18  deg  - ABNORMAL ECG -  Sinus rhythm  Right bundle branch block  Left anterior fascicular block  NO PRIOR TRACING AVAILABLE FOR COMPARISON  Electronically Signed By: Jesus Lau ([facility]) 18-Apr-2023 20:13:32  Date and Time of Study: 2023-04-18 14:52:30    Specimen Collected: 04/18/23 14:52 EDT Last Resulted: 04/18/23 20:13 EDT      Summary:                 Overall left ventricular function is normal. The ejection fraction biplane was calculated at 58%.                            There is no evidence of significant valvular heart disease or pulmonary hypertension.                            Mild dilation of the ascending aorta (4.2 -4.3 cm).       Neuro/Psych  (+) psychiatric history Depression,    (-) numbness  GI/Hepatic/Renal/Endo    (+)  GERD well controlled,      Musculoskeletal     (+) chronic pain,       ROS comment: Lumbar fusion  Has had 3 back surgeries   Abdominal    Substance History      OB/GYN          Other   arthritis,      ROS/Med Hx Other: Pt had opposite TKA in February and states spinal went very well even though she has had 3  back surgeries                   Anesthesia Plan    ASA 2     MAC, spinal and regional     intravenous induction     Anesthetic plan, risks, benefits, and alternatives have been provided, discussed and informed consent has been obtained with: patient.    Use of blood products discussed with patient  Consented to blood products.       CODE STATUS:

## 2023-05-01 NOTE — DISCHARGE SUMMARY
Outpatient Surgery Discharge Summary    Patient: Jocelyn Grimes  Medical Record #: [id number]  Age: 73 y.o.  : 1950    Admit date: 2023    Discharge date: 2023    Attending physician: Williams Segura MD    Admission diagnosis: M17.12    Discharge diagnosis: M17.12    Procedure performed: Left tka     Hospital Course:  Patient was admitted.  Underwent appropriate preoperative testing.  Underwent regional anesthesia and did well in the operating room and throughout the recovery period.   Discharged home in good condition with PT to start soon.     Admission condition: good    Discharge condition: good    Disposition: home    Meds: medicines reconciled and prescriptions signed on chart    Activity: see discharge instruction sheet  Diet: see discharge instruction sheet  Wound Care: see discharge instruction sheet    Follow up: Dr. Segura in 2-3 weeks    Signed:  Williams Segura MD  2023  07:20 EDT

## 2023-05-01 NOTE — H&P
Patient ID: Jocelyn Grimes     Chief Complaint:    Painful left knee    HPI:    Jocelyn Grimes is a 73 y.o. year old patient with end stage arthritis of the left knee.  Conservative treatment has failed.  Here today for elective total knee arthroplasty.    Past Medical History:   Diagnosis Date   • Anticoagulated 02/2023    h/o right leg DVT post-op   • Ascending aorta enlargement     Dr Bassem briggs, echo 12/2022   • Chronic left-sided low back pain with sciatica 12/28/2017   • Chronic pain disorder    • Depression    • Diverticulitis    • DVT of leg (deep venous thrombosis) 02/2023    right leg post-op   • Elevated cholesterol    • GERD (gastroesophageal reflux disease)    • Hypertension      Past Surgical History:   Procedure Laterality Date   • BACK SURGERY      x3   • BREAST BIOPSY Left    • COLECTOMY PARTIAL / TOTAL  12/2020   • COLONOSCOPY     • HYSTERECTOMY     • ILEOSTOMY CLOSURE  03/2021   • MEDIAL BRANCH BLOCK     • SPINAL CORD STIMULATOR IMPLANT     • SPINAL CORD STIMULATOR REMOVAL     • SPINAL FUSION     • TOTAL HIP ARTHROPLASTY Right 2016   • TOTAL KNEE ARTHROPLASTY Right 02/01/2023     Current Facility-Administered Medications   Medication Dose Route Frequency Provider Last Rate Last Admin   • acetaminophen (TYLENOL) tablet 1,000 mg  1,000 mg Oral Once Williams Segura MD       • ceFAZolin Sodium-Dextrose (ANCEF) IVPB (duplex) 2 g  2 g Intravenous Once Williams Segura MD       • dexamethasone (DECADRON) injection 8 mg  8 mg Intravenous Once Olinda Cervantes CRNA       • ethyl alcohol 62 % 2 each  2 swab Nasal Once Williams Segura MD       • famotidine (PEPCID) injection 20 mg  20 mg Intravenous 60 Min Pre-Op Olinda Cervantes CRNA       • gabapentin (NEURONTIN) capsule 300 mg  300 mg Oral Once Williams Segura MD       • lactated ringers infusion  9 mL/hr Intravenous Continuous Olinda Cervantes CRNA       • lidocaine PF 1% (XYLOCAINE) injection 0.5 mL  0.5 mL Injection  Once PRN Olinda Cervantes CRNA       • meloxicam (MOBIC) tablet 7.5 mg  7.5 mg Oral Once Williams Segura MD       • Midazolam HCl (PF) (VERSED) injection 0.5 mg  0.5 mg Intravenous Q10 Min PRN Olinda Cervantes CRNA       • ondansetron (ZOFRAN) injection 4 mg  4 mg Intravenous Once PRN Olinda Cervantes CRNA       • ropivacaine 0.5 % 30 mL, ketorolac 30 mg, EPINEPHrine 0.2 mg solution   Intra-articular Once Williams Segura MD       • sodium chloride 0.9 % flush 10 mL  10 mL Intravenous PRN Olinda Cervantes CRNA       • sodium chloride 0.9 % flush 10 mL  10 mL Intravenous Q12H Olinda Cervantes CRNA       • sodium chloride 0.9 % infusion 40 mL  40 mL Intravenous PRN Olinda Cervantes CRNA       • vancomycin (VANCOCIN) 1,500 mg in sodium chloride 0.9 % 500 mL IVPB  1,500 mg Intravenous Once Williams Segura MD         Social History     Tobacco Use   • Smoking status: Never   • Smokeless tobacco: Never   Substance Use Topics   • Alcohol use: No     Family History   Problem Relation Age of Onset   • Heart disease Mother    • Hypertension Father    • Heart disease Brother    • Malig Hyperthermia Neg Hx        Allergies   Allergen Reactions   • Oxycodone Other (See Comments)     Pt reports having hallucinations/  Pt can take percocet with no hallucinations--JUST CANNOT TAKE OXYCONTIN   • Mercury Other (See Comments)     MERCURY IN TOOTH FILLINGS CAUSED EXACERBATION OF FIBROMYALGIA; DENTIST REMOVED ALL TOOTH FILLINGS AND SX OF FIBROMYALGIA WENT AWAY   • Ezetimibe Unknown (See Comments)     Cant remember reaction   • Adhesive Tape Rash   • Hydrocodone Other (See Comments)     Doesn't help her pain     Immunization History   Administered Date(s) Administered   • Pneumococcal Conjugate 13-Valent (PCV13) 01/17/2017   • Pneumococcal Polysaccharide (PPSV23) 05/08/2020   • Tdap 08/01/2012     Vitals:    05/01/23 0658   BP: 127/87   Pulse: 68   Resp: 11   Temp: 98 °F (36.7 °C)   SpO2: 93%          ROS:    All other pertinent positives and negatives as noted above in HPI.    Physical Exam:     Alert at time of exam  Emotional Behavior - stable and appropriate  Gen- healthy appearing, in no acute distress  HEENT- Normocephalic, atraumatic  Extremity- no gross deformity, see office notes, no changes  Neuro- motor and sensory grossly intact, moving all extremities  Pulses- Present bilateral lower extremities, toes pink, BCR    Painful and restricted rom - left knee      Diagnostic Studies:     End stage arthritis of left knee.      Assessment:     left knee arthritis    Plan:      Elective left TKA today      CONSENT TO PROCEDURE/SEDATION  *  Information provided regarding procedure: Yes   *  Risk/Benefits Discussed: Yes   *  Alternative /Recovery Discussed: Yes   *  Need for Blood Discussed: Yes   *  Patient /Parent Consents to Planned Procedure/Sedation and accurately recounts discussion: Yes     The spectrum of treatment options were discussed with the patient in detail including both the nonoperative and operative treatment modalities and their respective risks and benefits.  After thorough discussion, the patient has elected to undergo surgical treatment.  The details of the surgical procedure were explained including the location of probable incisions and a description of the likely implants to be used.  Models and diagrams were used as educational resources. The patient understands the likely convalescence after surgery, as well as the rehabilitation required.  We thoroughly discussed the risks, benefits, and alternatives to surgery.  The risks include but are not limited to the risk of infection, joint stiffness, blood clots (including DVT and/or pulmonary embolus along with the risk of death), neurologic and/or vascular injury, fracture, dislocation, nonunion, malunion, need for further surgery including hardware failure requiring revision, and continued pain.  It was explained that if tissue has  been repaired or reconstructed, there is also a chance of failure which may require further management.  In addition, we have discussed the risks, including the risk of disease transmission, associated with any allograft tissue which may be utilized.  Following the completion of the discussion, the patient expressed understanding of this planned course of care, all their questions were answered and consent will be obtained preoperatively.

## 2023-05-01 NOTE — ANESTHESIA PROCEDURE NOTES
Spinal Block    Pre-sedation assessment completed: 5/1/2023 9:27 AM    Patient reassessed immediately prior to procedure    Start Time: 5/1/2023 9:28 AM  Stop Time: 5/1/2023 9:31 AM  Indication:at surgeon's request and post-op pain management  Performed By  LUANNE/MARIAM: Olinda Cervantes, LUANNESRNA: Lucila Stock SRNA  Preanesthetic Checklist  Completed: patient identified, IV checked, site marked, risks and benefits discussed, surgical consent, monitors and equipment checked, pre-op evaluation and timeout performed  Spinal Block Prep:  Patient Position:sitting  Sterile Tech:cap, gloves, mask and sterile barriers  Prep:Chloraprep  Patient Monitoring:blood pressure monitoring, continuous pulse oximetry and EKG    Spinal Block Procedure  Approach:midline  Guidance:landmark technique and palpation technique  Location:L3-L4  Needle Type:Sprotte  Needle Gauge:25 G  Placement of Spinal needle event:cerebrospinal fluid aspirated  Paresthesia: no  Fluid Appearance:clear  Medications: bupivacaine (MARCAINE) injection 0.5% - Injection   2 mL - 5/1/2023 9:28:00 AM   Post Assessment  Patient Tolerance:patient tolerated the procedure well with no apparent complications  Complications no

## 2023-05-01 NOTE — NURSING NOTE
1600  Received report from Abigail ZHOU and assumed care.  Able to tolerated liquids.  Denies nausea and vomiting.  Able to move her feet.  Push and pull are strong.   Pulses strong alma feet.  1620  Patient able to ambulate from bed to restroom with walker and able to urinate.  Tolerated well.  Pain at tolerable level and ice pack on the Left knee.  1640  Discharge instruction given to patient and patient's .  Handed discharge instruction form.  1650  Patient meets criteria for discharge.  Discharge patient via wheelchair accompanied by patient's .  Dressing dry and intact.

## 2023-05-01 NOTE — OP NOTE
Op Note Left TKA - Medial Congruent - Kinematic Alignment  OPERATIVE REPORT    Facility: [facility]  Patient name: Jocelyn Grimes  : 1950        Medical record: [id number]  Date of procedure: May 1, 2023  Pre-operative diagnosis: Left knee end-stage degenerative joint disease - Varus   Post-operative diagnosis: Same  Procedure performed:Left total knee arthroplasty CPT 59578  Approach: Parapatellar   Implants:  Medacta Sphere TKA - Nickel free   Femur: 3 CR   Tibial tray: 3    Patella - 1   Bearing - 10 mm Medial Congruent    Kinematic Alignment TKA    Varus   ACL intact  PCL intact    Distal Femur     Medial - worn 6 mm  Lateral - unworn 8 mm    Posterior femur    Medial - unworn 7 mm  Lateral - unworn 7 mm    Tibia    Medial spine - 9.5 mm  Lateral spine - 9.5 mm    Recut - no    Final check:  Negligible V-V in extension  2-3 mm opening w varus in 15-30 degrees of flexion      Surgeon: Williams Segura M.D.   Assistant(s):  1. MICHAEL Alexander    Anesthesia: Spinal/Adductor Canal Femoral Nerve/IPAC Block  Anesthesiologist: Dayami  Estimated blood loss: 50 milliliters   Drains: none  Specimens: None  Complications: None apparent     Findings: severe knee arthritis            INDICATIONS:  Jocelyn Grimes is a pleasant 73 y.o. year old who presented to my office with a long history of knee pain. The patient failed conservative therapy and we discussed surgical treatment options including total knee arthroplasty. Prior to surgery we discussed the risks, benefits, alternatives to surgery, and surgical convalescence.  Surgical consent was obtained both in the office, as well as the day of surgery. Risks of the procedure include, but are not limited to, infection, DVT, PE, damage to nerves or blood vessels at the time of surgery, bleeding and blood loss, stiffness/arthrofibrosis, and risk of loosening or failure of the components requiring revision surgery. The patient expressed understanding and wished to  proceed with the surgery. An informed consent form was signed and placed on the chart prior to coming to the Operating Room.       PROCEDURE: The patient was brought to the operating room and once obtaining satisfactory anesthesia was placed in the supine position. The knee was prepped and draped in the usual sterile fashion for a total knee replacement. The leg was exsanguinated with an Esmarch bandage and the pneumatic tourniquet inflated to 250 mm mercury. A surgical timeout was held verifying the site, procedure, and that pre-operative antibiotics had been given.  A longitudinal incision was over the anterior aspect of the knee exposing the extensor mechanism. An arthrotomy was performed and the patella displaced laterally. The gross pathologic findings revealed severe degenerative arthritis.    Subperiosteal dissection was continued around the proximal medial tibia. The necessary soft tissue release was performed to correct the fixed angular deformity. Care was taken to protect and preserve the medial collateral ligament.     The drill was used to open the femoral canal. After over drilling the canal, the intramedullary femoral guide was seated and the distal femur was resected utilizing the worn and unworn paddles. The femoral sizing guide was set at neutral external rotation.  The femur was then sized and the appropriate component selected. The anterior and posterior condyles were then resected with the appropriately sized guide and oscillating saw.  All bone resections were measured and confirmed to be appropriate thicknesses.       Then the extramedullary tibial cutting guide was then placed and the proximal tibia was resected at the appropriate level along the axis of the tibia.    With the knee at 90 degrees of flexion, laminar spacers were placed between the femur and tibia. The remaining anterior cruciate ligament was excised. The posterior cruciate ligament was left intact.  A medial and lateral  meniscectomy were performed. A mixture of 60cc of saline, 0.5% ropivicaine, 10mg Morphine, 30mg toradol, 0.2mg of epinephrine, and 40 mg kenalog were injected into the knee joint capsule posteriorly while the lamina spreaders were in position and in the deep tissues.  The solution without steroid was utilized at the capsulotomy incision sites prior to closure for local anesthesia.     The extension space was confirmed to be appropriate with full extension of the knee with an appropriately sized spacer and to be well balanced with varus and valgus stress.     The flexion and extension gaps were checked with the appropriate sized spacer and noted to be well balanced. At this time, the tibial cut was checked with the spacer block.    The tibial fixation hole was created with the tibial template and broach.     The synovium was excised from around the patella, after which the patella thickness was measured with calipers.  The patella was then prepared if the native thickness allowed resurfacing.  The appropriate sized patellar template was seated and the three fixation holes were created with a drill.      A bone plug was then shaped and place in the opening created for the intramedullary femoral guide.    The trial components were then placed and the knee was found to have proper alignment and was stable through a full range of flexion and extension. The trial components were removed and the bony surfaces were cleaned with pulsatile lavage and an antibiotic solution. The bone was then dried and the permanent components were cemented in a sequential fashion. The tibial component was cemented first.  Set in the proper position and rotation.  The tibial component was impacted into place, it was fully seated and excess cemented was removed.  The femoral component was then cemented in place followed by the patella. Excess cement was removed.     Once the cement was hardened the tibial articular surface was implanted. The  knee was then reduced and found to have good flexion, full extension with good stability and proper alignment. The patella tracked central.    A dilute (0.35%) betadine lavage was placed in the arthrotomy site to soak the components for 3 minutes prior to wound closure. The solution was made by adding 17.5 ml of 10% povidone-iodine in 500 cc of normal saline, resulting in a 0.35% dilution. This solution was then removed from the knee and the knee was copiously irrigated.    The tourniquet was then released and hemostasis was obtained with electrocautery. The knee irrigated with pulsatile lavage and antibiotic solution followed by normal saline.     The arthrotomy was closed with # 0 Vicryl interrupted sutures and #1 stratafix barbed suture. The subcutaneous tissue was closed in layers with # 0 and # 2-0 stratafix barbed sutures. The skin was closed with 3-0 stratfix barbed sutures and dermabond. A sterile compressive dressing was applied. The patient tolerated the procedure well, without complication, and was transferred to the recovery room in a stable condition. The sponge and instrument count was correct.      A surgical first assistant was required and necessary for the completion of this case to aid in manipulation and positioning of the extremity while the surgeon operated.  Their assistance was vital to the safety and success of the procedure.     Deep venous thrombosis prophylaxis will consist of home eliquis according to CHEST guidelines.             ____________________________  Williams Segura M.D.

## 2024-09-18 NOTE — TELEPHONE ENCOUNTER
PAULINO KELLOGG ([place]: [id number]) - [id number]  Flector 1.3% TD PTCH    
  PAULINO KELLOGG ([place]: [id number]) - [id number]  Lidocaine 5% EX PTCH  Status: PA Response - Denied    
pain/impaired postural control/decreased ROM/decreased strength